# Patient Record
Sex: FEMALE | Race: WHITE | NOT HISPANIC OR LATINO | Employment: OTHER | RURAL
[De-identification: names, ages, dates, MRNs, and addresses within clinical notes are randomized per-mention and may not be internally consistent; named-entity substitution may affect disease eponyms.]

---

## 2024-06-04 ENCOUNTER — OFFICE VISIT (OUTPATIENT)
Dept: FAMILY MEDICINE CLINIC | Facility: CLINIC | Age: 74
End: 2024-06-04
Payer: MEDICARE

## 2024-06-04 VITALS
OXYGEN SATURATION: 95 % | DIASTOLIC BLOOD PRESSURE: 60 MMHG | BODY MASS INDEX: 45.99 KG/M2 | WEIGHT: 293 LBS | SYSTOLIC BLOOD PRESSURE: 120 MMHG | HEIGHT: 67 IN | HEART RATE: 50 BPM

## 2024-06-04 DIAGNOSIS — G89.4 CHRONIC PAIN SYNDROME: ICD-10-CM

## 2024-06-04 DIAGNOSIS — E11.42 TYPE 2 DIABETES MELLITUS WITH DIABETIC POLYNEUROPATHY, WITH LONG-TERM CURRENT USE OF INSULIN: ICD-10-CM

## 2024-06-04 DIAGNOSIS — E03.9 ACQUIRED HYPOTHYROIDISM: ICD-10-CM

## 2024-06-04 DIAGNOSIS — E78.2 MIXED HYPERLIPIDEMIA: ICD-10-CM

## 2024-06-04 DIAGNOSIS — I10 ESSENTIAL HYPERTENSION: Primary | ICD-10-CM

## 2024-06-04 DIAGNOSIS — Z79.01 CHRONIC ANTICOAGULATION: ICD-10-CM

## 2024-06-04 DIAGNOSIS — Z79.4 TYPE 2 DIABETES MELLITUS WITH DIABETIC POLYNEUROPATHY, WITH LONG-TERM CURRENT USE OF INSULIN: ICD-10-CM

## 2024-06-04 DIAGNOSIS — H60.332 ACUTE SWIMMER'S EAR OF LEFT SIDE: ICD-10-CM

## 2024-06-04 PROCEDURE — 1125F AMNT PAIN NOTED PAIN PRSNT: CPT | Performed by: STUDENT IN AN ORGANIZED HEALTH CARE EDUCATION/TRAINING PROGRAM

## 2024-06-04 PROCEDURE — 99204 OFFICE O/P NEW MOD 45 MIN: CPT | Performed by: STUDENT IN AN ORGANIZED HEALTH CARE EDUCATION/TRAINING PROGRAM

## 2024-06-04 RX ORDER — INSULIN DETEMIR 100 [IU]/ML
INJECTION, SOLUTION SUBCUTANEOUS
COMMUNITY
Start: 2024-05-07

## 2024-06-04 RX ORDER — OXYBUTYNIN CHLORIDE 10 MG/1
TABLET, EXTENDED RELEASE ORAL
COMMUNITY
Start: 2024-05-16

## 2024-06-04 RX ORDER — CLOPIDOGREL BISULFATE 75 MG/1
TABLET ORAL
COMMUNITY
Start: 2024-05-08

## 2024-06-04 RX ORDER — CELECOXIB 50 MG/1
CAPSULE ORAL
COMMUNITY
Start: 2024-03-13

## 2024-06-04 RX ORDER — GABAPENTIN 400 MG/1
1 CAPSULE ORAL 3 TIMES DAILY
COMMUNITY
Start: 2024-05-16

## 2024-06-04 RX ORDER — ASPIRIN 81 MG/1
81 TABLET ORAL
COMMUNITY

## 2024-06-04 RX ORDER — HYDROCODONE BITARTRATE AND ACETAMINOPHEN 5; 325 MG/1; MG/1
TABLET ORAL
COMMUNITY
Start: 2024-04-29

## 2024-06-04 RX ORDER — BEMPEDOIC ACID AND EZETIMIBE 180; 10 MG/1; MG/1
TABLET, FILM COATED ORAL
COMMUNITY
Start: 2024-05-08

## 2024-06-04 RX ORDER — RIVAROXABAN 15 MG/1
TABLET, FILM COATED ORAL
COMMUNITY
Start: 2024-05-29

## 2024-06-04 RX ORDER — POTASSIUM CHLORIDE 20 MEQ/1
20 TABLET, EXTENDED RELEASE ORAL DAILY
COMMUNITY

## 2024-06-04 RX ORDER — ALBUTEROL SULFATE 90 UG/1
AEROSOL, METERED RESPIRATORY (INHALATION)
COMMUNITY

## 2024-06-04 RX ORDER — ZINC GLUCONATE 50 MG
50 TABLET ORAL DAILY
COMMUNITY

## 2024-06-04 RX ORDER — CIPROFLOXACIN 0.5 MG/.25ML
0.25 SOLUTION/ DROPS AURICULAR (OTIC) 2 TIMES DAILY
Qty: 1 EACH | Refills: 0 | Status: SHIPPED | OUTPATIENT
Start: 2024-06-04 | End: 2024-06-06

## 2024-06-04 RX ORDER — SENNOSIDES A AND B 8.6 MG/1
17.2 TABLET, FILM COATED ORAL
COMMUNITY
Start: 2023-07-28 | End: 2024-07-27

## 2024-06-04 RX ORDER — LISINOPRIL 10 MG/1
TABLET ORAL
COMMUNITY
Start: 2024-02-16

## 2024-06-04 RX ORDER — SEMAGLUTIDE 1.34 MG/ML
INJECTION, SOLUTION SUBCUTANEOUS
COMMUNITY
Start: 2024-03-11

## 2024-06-04 RX ORDER — LEVOTHYROXINE SODIUM 0.15 MG/1
TABLET ORAL
COMMUNITY
Start: 2024-03-11

## 2024-06-04 RX ORDER — METOPROLOL SUCCINATE 25 MG/1
TABLET, EXTENDED RELEASE ORAL
COMMUNITY

## 2024-06-04 RX ORDER — CITALOPRAM 20 MG/1
TABLET ORAL
COMMUNITY
Start: 2024-05-20

## 2024-06-04 RX ORDER — INSULIN ASPART 100 [IU]/ML
INJECTION, SOLUTION INTRAVENOUS; SUBCUTANEOUS
COMMUNITY
Start: 2024-02-19

## 2024-06-04 RX ORDER — ROSUVASTATIN CALCIUM 40 MG/1
TABLET, COATED ORAL
COMMUNITY
Start: 2024-03-11

## 2024-06-04 RX ORDER — BUSPIRONE HYDROCHLORIDE 5 MG/1
TABLET ORAL
COMMUNITY
Start: 2024-05-20

## 2024-06-04 RX ORDER — DAPAGLIFLOZIN 10 MG/1
10 TABLET, FILM COATED ORAL DAILY
COMMUNITY
Start: 2023-07-29 | End: 2024-07-28

## 2024-06-04 NOTE — PROGRESS NOTES
"Chief Complaint  Establish Care (Pt has been seeing a doctor with [facility]. She hurt her Left shoulder a few months ago.)    Subjective          Marisol Mariano presents to [facility]  History of Present Illness    Patient is here to establish care.     Patient is having pain in the left arm and shoulder as well as her right knee.. She has had X-rays and were not told anything for this. She states that she has been having more pain with this.  Patient states that she has not had evaluation of the knee since she had reconstructive surgery on the lower leg and ankle.  Patient states that she is unable to bear weight for any distance secondary to pain.    She is currently on Hydrocodone 5/325 and Gabapentin and takes this consitently.  Per daughter and son she takes at least 2 of the hydrocodone 5/325 daily and occasionally will take a third as well as taking the gabapentin consistently 3 times daily.  Patient only recently moved in with family, and they have been monitoring her medications rigorously since.    She has been having trouble with her ear as well. She has hearing loss in the Right ear and has been having more fullness in the left ear which she states is her good ear.     She see cardiology as well as OBGYN.  Patient was seeing an endocrinologist, but they have retired and she would like to get set up with a new one close if possible.      Objective   Vital Signs:   /60   Pulse 50   Ht 170.2 cm (67\")   Wt 136 kg (299 lb)   SpO2 95%   BMI 46.83 kg/m²     Body mass index is 46.83 kg/m².    Review of Systems    Past History:  Medical History: has a past medical history of Anxiety, COPD (chronic obstructive pulmonary disease), Deep vein thrombosis, Depression, Diabetes mellitus, HL (hearing loss), Hyperlipidemia, Hypertension, Hypothyroidism, Myocardial infarction, Pneumonia, Rheumatoid arthritis, and UTI (urinary tract infection).   Surgical History: has a " past surgical history that includes Varicose vein surgery; Knee Arthroplasty (Left); Leg Surgery (Right); Fracture surgery; Cardiac catheterization; Colonoscopy; Cardiac surgery; Cardiac defibrillator placement; and Tubal ligation.   Family History: family history is not on file.   Social History: reports that she has quit smoking. Her smoking use included cigarettes. She started smoking about 59 years ago. She has a 110 pack-year smoking history. She has never used smokeless tobacco. She reports that she does not currently use alcohol. She reports that she does not use drugs.      Current Outpatient Medications:     busPIRone (BUSPAR) 5 MG tablet, TAKE 1 TABLET BY MOUTH TWO TIMES EVERY DAY, Disp: , Rfl:     celecoxib (CeleBREX) 50 MG capsule, TAKE 1 CAPSULE BY MOUTH EVERY DAY -TAKE WITH FOOD, Disp: , Rfl:     citalopram (CeleXA) 20 MG tablet, TAKE 1 & 1/2 TABLETS BY MOUTH ONE TIME EVERY DAY, Disp: , Rfl:     clopidogrel (PLAVIX) 75 MG tablet, TAKE 1 TABLET BY MOUTH ONE TIME EVERY DAY, Disp: , Rfl:     dapagliflozin Propanediol 10 MG tablet, Take 10 mg by mouth Daily., Disp: , Rfl:     gabapentin (NEURONTIN) 400 MG capsule, Take 1 capsule by mouth 3 times a day., Disp: , Rfl:     HYDROcodone-acetaminophen (NORCO) 5-325 MG per tablet, TAKE 1 TABLET THREE TIMES A DAY IF NEEDED FOR PAIN, Disp: , Rfl:     Levemir FlexPen 100 UNIT/ML injection, ADMINISTER 5 UNIT(S) SUBCUTANEOUSLY EVERY NIGHT AT BEDTIME - DISCARD ANY OPENED PEN AFTER 42 DAYS, Disp: , Rfl:     levothyroxine (SYNTHROID, LEVOTHROID) 150 MCG tablet, TAKE 1 TABLET BY MOUTH ONE TIME EVERY DAY -TAKE ON AN EMPTY STOMACH, Disp: , Rfl:     lisinopril (PRINIVIL,ZESTRIL) 10 MG tablet, TAKE 1 TABLET(S) BY MOUTH EVERY DAY, Disp: , Rfl:     Nexlizet 180-10 MG tablet, TAKE 1 TABLET BY MOUTH ONE TIME EVERY DAY, Disp: , Rfl:     NovoLOG FlexPen 100 UNIT/ML solution pen-injector sc pen, ADMINISTER 5 UNIT(S) SUBCUTANEOUSLY THREE TIMES A DAY WITH MEALS, Disp: , Rfl:      oxybutynin XL (DITROPAN-XL) 10 MG 24 hr tablet, TAKE 2 TABLETS BY MOUTH ONE TIME EVERY DAY, Disp: , Rfl:     Ozempic, 1 MG/DOSE, 4 MG/3ML solution pen-injector, INJECT 1 MG UNDER THE SKIN EACH WEEK AS DIRECTED, Disp: , Rfl:     rosuvastatin (CRESTOR) 40 MG tablet, TAKE 1 TABLET BY MOUTH EACH NIGHT AT BEDTIME, Disp: , Rfl:     senna (SENOKOT) 8.6 MG tablet, Take 2 tablets by mouth., Disp: , Rfl:     Xarelto 15 MG tablet, , Disp: , Rfl:     albuterol sulfate  (90 Base) MCG/ACT inhaler, puff(s) inhaled 4 times a day, Disp: , Rfl:     aspirin 81 MG EC tablet, Take 1 tablet by mouth., Disp: , Rfl:     Ciprofloxacin HCl (CETRAXAL) 0.2 % otic solution, Administer 0.25 mL into ear(s) as directed by provider 2 (Two) Times a Day., Disp: 1 each, Rfl: 0    metoprolol succinate XL (TOPROL-XL) 25 MG 24 hr tablet, TAKE 1 TABLET BY MOUTH ONE TIME EVERY DAY, Disp: , Rfl:     potassium chloride (KLOR-CON M20) 20 MEQ CR tablet, Take 1 tablet by mouth Daily., Disp: , Rfl:     zinc gluconate 50 MG tablet, Take 1 tablet by mouth Daily., Disp: , Rfl:     Allergies: Patient has no known allergies.    Physical Exam  Constitutional:       General: She is not in acute distress.     Appearance: She is not ill-appearing or toxic-appearing.   HENT:      Head: Normocephalic and atraumatic.   Cardiovascular:      Rate and Rhythm: Normal rate and regular rhythm.      Heart sounds: No murmur heard.  Pulmonary:      Effort: Pulmonary effort is normal. No respiratory distress.   Musculoskeletal:      Right lower leg: Edema (Lymphedmedema) present.      Left lower leg: Edema (Lymphedmedema) present.   Neurological:      General: No focal deficit present.      Mental Status: She is alert and oriented to person, place, and time.   Psychiatric:         Mood and Affect: Mood normal.         Thought Content: Thought content normal.          Result Review :                   Assessment and Plan    Diagnoses and all orders for this visit:    1.  Essential hypertension (Primary)  -     Comprehensive Metabolic Panel; Future  -     CBC & Differential; Future  -     Magnesium; Future    2. Mixed hyperlipidemia    3. Type 2 diabetes mellitus with diabetic polyneuropathy, with long-term current use of insulin  -     Ambulatory Referral to Endocrinology  -     Hemoglobin A1c; Future  -     Lipid Panel; Future    4. Chronic pain syndrome    5. Chronic anticoagulation    6. Acquired hypothyroidism  -     TSH; Future  -     T4, Free; Future    7. Acute swimmer's ear of left side    Other orders  -     Ciprofloxacin HCl (CETRAXAL) 0.2 % otic solution; Administer 0.25 mL into ear(s) as directed by provider 2 (Two) Times a Day.  Dispense: 1 each; Refill: 0    Blood work ordered to have done when she has her cardiology blood work done.    Will send referral to endocrinology for further evaluation and management.    Continue to see both cardiology and OB/GYN.    Patient with outer ear infection.  Will send in Cipro for otitis externa.     Will discuss gabapentin and Hydrocodone refill when needed.     Follow Up   No follow-ups on file.  Patient was given instructions and counseling regarding her condition or for health maintenance advice. Please see specific information pulled into the AVS if appropriate.     Deirdre Atkinson, DO

## 2024-06-06 ENCOUNTER — TELEPHONE (OUTPATIENT)
Dept: FAMILY MEDICINE CLINIC | Facility: CLINIC | Age: 74
End: 2024-06-06
Payer: MEDICARE

## 2024-06-06 RX ORDER — CIPROFLOXACIN HYDROCHLORIDE 3.5 MG/ML
4 SOLUTION/ DROPS TOPICAL 3 TIMES DAILY
Qty: 10 ML | Refills: 0 | Status: SHIPPED | OUTPATIENT
Start: 2024-06-06

## 2024-06-06 NOTE — TELEPHONE ENCOUNTER
Caller: LAURAKAYLA    Relationship: Emergency Contact    Best call back number: [phone]    What medication are you requesting: ANTIBIOTIC FOR BILATERAL EAR INFECTION.    If a prescription is needed, what is your preferred pharmacy and phone number:    [address] [phone]  - [phone]       Additional notes: THE PHARMACY ADVISED THAT     Ciprofloxacin HCl (CETRAXAL) 0.2 % otic solution   IS NOT MADE ANYMORE BY THE .   PLEASE SEND IN ANOTHER RX ASAP SO PATIENT CAN START IT.    THANK YOU!!

## 2024-06-10 RX ORDER — HYDROCODONE BITARTRATE AND ACETAMINOPHEN 5; 325 MG/1; MG/1
TABLET ORAL
OUTPATIENT
Start: 2024-06-10

## 2024-06-10 NOTE — TELEPHONE ENCOUNTER
please call. We need to know why she's taking it, etc.... what we may end up needing to do is me send in enough to get her to where Dr. Long's can see her again to initiate the controlled substance agreement and UDS process.

## 2024-06-10 NOTE — TELEPHONE ENCOUNTER
Michaela, please review since [place] is out, she was a new pt last week and she was not due for refill on this. Please let me know if you want me to call the family and get more information on this.

## 2024-06-10 NOTE — TELEPHONE ENCOUNTER
Caller: TALIA SANCHEZ    Relationship: Child    Best call back number: [phone]     Requested Prescriptions:   Requested Prescriptions     Pending Prescriptions Disp Refills    HYDROcodone-acetaminophen (NORCO) 5-325 MG per tablet          Pharmacy where request should be sent: [address] [phone] [place] [phone]      Last office visit with prescribing clinician: 6/4/2024   Last telemedicine visit with prescribing clinician: Visit date not found   Next office visit with prescribing clinician: 7/5/2024     Does the patient have less than a 3 day supply:  [x] Yes  [] No    Would you like a call back once the refill request has been completed: [x] Yes [] No    If the office needs to give you a call back, can they leave a voicemail: [x] Yes [] No    Ruthy Melo Rep   06/10/24 09:20 EDT

## 2024-06-11 NOTE — TELEPHONE ENCOUNTER
Caller: KAYLA SANCHEZ    Relationship: Emergency Contact    Best call back number: [phone]    What was the call regarding: FOLLOWING UP ON REFILL REQUEST. PATIENT IS COMPLETELY OUT. PLEASE ADVISE    Is it okay if the provider responds through MyChart: NO

## 2024-06-12 DIAGNOSIS — G89.29 CHRONIC SHOULDER PAIN, UNSPECIFIED LATERALITY: ICD-10-CM

## 2024-06-12 DIAGNOSIS — M79.606 PAIN OF LOWER EXTREMITY, UNSPECIFIED LATERALITY: Primary | ICD-10-CM

## 2024-06-12 DIAGNOSIS — M25.519 CHRONIC SHOULDER PAIN, UNSPECIFIED LATERALITY: ICD-10-CM

## 2024-06-12 RX ORDER — HYDROCODONE BITARTRATE AND ACETAMINOPHEN 5; 325 MG/1; MG/1
1 TABLET ORAL EVERY 8 HOURS PRN
Qty: 30 TABLET | Refills: 0 | Status: SHIPPED | OUTPATIENT
Start: 2024-06-12

## 2024-06-25 ENCOUNTER — OFFICE VISIT (OUTPATIENT)
Dept: FAMILY MEDICINE CLINIC | Facility: CLINIC | Age: 74
End: 2024-06-25
Payer: MEDICARE

## 2024-06-25 VITALS
HEART RATE: 56 BPM | WEIGHT: 293 LBS | HEIGHT: 67 IN | DIASTOLIC BLOOD PRESSURE: 50 MMHG | BODY MASS INDEX: 45.99 KG/M2 | OXYGEN SATURATION: 93 % | SYSTOLIC BLOOD PRESSURE: 88 MMHG

## 2024-06-25 DIAGNOSIS — M54.41 CHRONIC BILATERAL LOW BACK PAIN WITH BILATERAL SCIATICA: ICD-10-CM

## 2024-06-25 DIAGNOSIS — G89.29 CHRONIC BILATERAL LOW BACK PAIN WITH BILATERAL SCIATICA: ICD-10-CM

## 2024-06-25 DIAGNOSIS — R05.8 PRODUCTIVE COUGH: ICD-10-CM

## 2024-06-25 DIAGNOSIS — Z79.4 TYPE 2 DIABETES MELLITUS WITH DIABETIC POLYNEUROPATHY, WITH LONG-TERM CURRENT USE OF INSULIN: Primary | ICD-10-CM

## 2024-06-25 DIAGNOSIS — M79.606 PAIN OF LOWER EXTREMITY, UNSPECIFIED LATERALITY: ICD-10-CM

## 2024-06-25 DIAGNOSIS — M25.519 CHRONIC SHOULDER PAIN, UNSPECIFIED LATERALITY: ICD-10-CM

## 2024-06-25 DIAGNOSIS — E11.42 TYPE 2 DIABETES MELLITUS WITH DIABETIC POLYNEUROPATHY, WITH LONG-TERM CURRENT USE OF INSULIN: Primary | ICD-10-CM

## 2024-06-25 DIAGNOSIS — M54.42 CHRONIC BILATERAL LOW BACK PAIN WITH BILATERAL SCIATICA: ICD-10-CM

## 2024-06-25 DIAGNOSIS — Z51.81 MEDICATION MONITORING ENCOUNTER: ICD-10-CM

## 2024-06-25 DIAGNOSIS — G89.29 CHRONIC SHOULDER PAIN, UNSPECIFIED LATERALITY: ICD-10-CM

## 2024-06-25 LAB
POC AMPHETAMINES: NEGATIVE
POC BARBITURATES: NEGATIVE
POC BENZODIAZEPHINES: NEGATIVE
POC COCAINE: NEGATIVE
POC METHADONE: NEGATIVE
POC METHAMPHETAMINE SCREEN URINE: NEGATIVE
POC OPIATES: POSITIVE
POC OXYCODONE: NEGATIVE
POC PHENCYCLIDINE: NEGATIVE
POC PROPOXYPHENE: NEGATIVE
POC THC: NEGATIVE
POC TRICYCLIC ANTIDEPRESSANTS: NEGATIVE

## 2024-06-25 PROCEDURE — 99214 OFFICE O/P EST MOD 30 MIN: CPT | Performed by: STUDENT IN AN ORGANIZED HEALTH CARE EDUCATION/TRAINING PROGRAM

## 2024-06-25 PROCEDURE — 1125F AMNT PAIN NOTED PAIN PRSNT: CPT | Performed by: STUDENT IN AN ORGANIZED HEALTH CARE EDUCATION/TRAINING PROGRAM

## 2024-06-25 RX ORDER — HYDROCODONE BITARTRATE AND ACETAMINOPHEN 5; 325 MG/1; MG/1
1 TABLET ORAL EVERY 8 HOURS PRN
Qty: 90 TABLET | Refills: 0 | Status: SHIPPED | OUTPATIENT
Start: 2024-06-25

## 2024-06-25 RX ORDER — GABAPENTIN 400 MG/1
400 CAPSULE ORAL 3 TIMES DAILY
Qty: 90 CAPSULE | Refills: 2 | Status: SHIPPED | OUTPATIENT
Start: 2024-06-25

## 2024-06-25 RX ORDER — CELECOXIB 50 MG/1
50 CAPSULE ORAL 2 TIMES DAILY
Qty: 180 CAPSULE | Refills: 1 | Status: SHIPPED | OUTPATIENT
Start: 2024-06-25

## 2024-06-25 NOTE — PROGRESS NOTES
"Chief Complaint  Cough (Cough x 2 weeks) and Med Refill (Refill her pain medicine, gabapentin, celebrex)    Subjective          Marisol Mariano presents to [facility]  URI   This is a new problem. The current episode started 1 to 4 weeks ago. The problem has been gradually worsening. Associated symptoms include congestion, coughing, headaches, a plugged ear sensation, rhinorrhea and a sore throat. Associated symptoms comments: headaches. She has tried nothing for the symptoms. The treatment provided mild relief.     Patient states that she has been having worsening pain.  She states that she feels as if the pain medication is not a high enough dose, or she is not getting it frequently enough.  Patient states that she takes the Norco 5/325 every 8 hours, and takes the gabapentin 3 times a day as well.  She also has low-dose Celebrex which she does not know that it helps very much.  She has not been to pain management in the past.            Objective   Vital Signs:   BP (!) 88/50   Pulse 56   Ht 170.2 cm (67\")   Wt 135 kg (298 lb)   SpO2 93%   BMI 46.67 kg/m²     Body mass index is 46.67 kg/m².    Review of Systems   HENT:  Positive for congestion, rhinorrhea and sore throat.    Respiratory:  Positive for cough.        Past History:  Medical History: has a past medical history of Anxiety, COPD (chronic obstructive pulmonary disease), Deep vein thrombosis, Depression, Diabetes mellitus, HL (hearing loss), Hyperlipidemia, Hypertension, Hypothyroidism, Myocardial infarction, Pneumonia, Rheumatoid arthritis, and UTI (urinary tract infection).   Surgical History: has a past surgical history that includes Varicose vein surgery; Knee Arthroplasty (Left); Leg Surgery (Right); Fracture surgery; Cardiac catheterization; Colonoscopy; Cardiac surgery; Cardiac defibrillator placement; and Tubal ligation.   Family History: family history is not on file.   Social History: reports that she has " quit smoking. Her smoking use included cigarettes. She started smoking about 59 years ago. She has a 110 pack-year smoking history. She has never used smokeless tobacco. She reports that she does not currently use alcohol. She reports that she does not use drugs.      Current Outpatient Medications:     celecoxib (CeleBREX) 50 MG capsule, Take 1 capsule by mouth 2 (Two) Times a Day., Disp: 180 capsule, Rfl: 1    gabapentin (NEURONTIN) 400 MG capsule, Take 1 capsule by mouth 3 times a day., Disp: 90 capsule, Rfl: 2    HYDROcodone-acetaminophen (NORCO) 5-325 MG per tablet, Take 1 tablet by mouth Every 8 (Eight) Hours As Needed for Moderate Pain or Severe Pain., Disp: 90 tablet, Rfl: 0    albuterol sulfate  (90 Base) MCG/ACT inhaler, puff(s) inhaled 4 times a day, Disp: , Rfl:     amoxicillin-clavulanate (AUGMENTIN) 875-125 MG per tablet, Take 1 tablet by mouth 2 (Two) Times a Day., Disp: 14 tablet, Rfl: 0    aspirin 81 MG EC tablet, Take 1 tablet by mouth., Disp: , Rfl:     busPIRone (BUSPAR) 5 MG tablet, TAKE 1 TABLET BY MOUTH TWO TIMES EVERY DAY, Disp: , Rfl:     ciprofloxacin (Ciloxan) 0.3 % ophthalmic solution, Administer 4 drops into ear(s) as directed by provider 3 times a day., Disp: 10 mL, Rfl: 0    citalopram (CeleXA) 20 MG tablet, TAKE 1 & 1/2 TABLETS BY MOUTH ONE TIME EVERY DAY, Disp: , Rfl:     clopidogrel (PLAVIX) 75 MG tablet, TAKE 1 TABLET BY MOUTH ONE TIME EVERY DAY, Disp: , Rfl:     dapagliflozin Propanediol 10 MG tablet, Take 10 mg by mouth Daily., Disp: , Rfl:     Levemir FlexPen 100 UNIT/ML injection, ADMINISTER 5 UNIT(S) SUBCUTANEOUSLY EVERY NIGHT AT BEDTIME - DISCARD ANY OPENED PEN AFTER 42 DAYS, Disp: , Rfl:     levothyroxine (SYNTHROID, LEVOTHROID) 150 MCG tablet, TAKE 1 TABLET BY MOUTH ONE TIME EVERY DAY -TAKE ON AN EMPTY STOMACH, Disp: , Rfl:     lisinopril (PRINIVIL,ZESTRIL) 10 MG tablet, TAKE 1 TABLET(S) BY MOUTH EVERY DAY, Disp: , Rfl:     metoprolol succinate XL (TOPROL-XL) 25 MG  24 hr tablet, TAKE 1 TABLET BY MOUTH ONE TIME EVERY DAY, Disp: , Rfl:     Nexlizet 180-10 MG tablet, TAKE 1 TABLET BY MOUTH ONE TIME EVERY DAY, Disp: , Rfl:     NovoLOG FlexPen 100 UNIT/ML solution pen-injector sc pen, ADMINISTER 5 UNIT(S) SUBCUTANEOUSLY THREE TIMES A DAY WITH MEALS, Disp: , Rfl:     oxybutynin XL (DITROPAN-XL) 10 MG 24 hr tablet, TAKE 2 TABLETS BY MOUTH ONE TIME EVERY DAY, Disp: , Rfl:     Ozempic, 1 MG/DOSE, 4 MG/3ML solution pen-injector, INJECT 1 MG UNDER THE SKIN EACH WEEK AS DIRECTED, Disp: , Rfl:     potassium chloride (KLOR-CON M20) 20 MEQ CR tablet, Take 1 tablet by mouth Daily., Disp: , Rfl:     rosuvastatin (CRESTOR) 40 MG tablet, TAKE 1 TABLET BY MOUTH EACH NIGHT AT BEDTIME, Disp: , Rfl:     senna (SENOKOT) 8.6 MG tablet, Take 2 tablets by mouth., Disp: , Rfl:     Xarelto 15 MG tablet, , Disp: , Rfl:     zinc gluconate 50 MG tablet, Take 1 tablet by mouth Daily., Disp: , Rfl:     Allergies: Patient has no known allergies.    Physical Exam  Constitutional:       General: She is not in acute distress.     Appearance: She is not ill-appearing or toxic-appearing.   HENT:      Head: Normocephalic and atraumatic.   Cardiovascular:      Rate and Rhythm: Normal rate and regular rhythm.      Heart sounds: No murmur heard.  Pulmonary:      Effort: Pulmonary effort is normal. No respiratory distress.   Musculoskeletal:      Right lower leg: Edema (Lymphedmedema) present.      Left lower leg: Edema (Lymphedmedema) present.      Comments: In a wheelchair   Neurological:      General: No focal deficit present.      Mental Status: She is alert and oriented to person, place, and time.   Psychiatric:         Mood and Affect: Mood normal.         Thought Content: Thought content normal.          Result Review :                   Assessment and Plan    Diagnoses and all orders for this visit:    1. Type 2 diabetes mellitus with diabetic polyneuropathy, with long-term current use of insulin (Primary)  -      Microalbumin / Creatinine Urine Ratio - Urine, Clean Catch; Future  -     Microalbumin / Creatinine Urine Ratio - Urine, Clean Catch    2. Pain of lower extremity, unspecified laterality  -     gabapentin (NEURONTIN) 400 MG capsule; Take 1 capsule by mouth 3 times a day.  Dispense: 90 capsule; Refill: 2  -     HYDROcodone-acetaminophen (NORCO) 5-325 MG per tablet; Take 1 tablet by mouth Every 8 (Eight) Hours As Needed for Moderate Pain or Severe Pain.  Dispense: 90 tablet; Refill: 0    3. Chronic shoulder pain, unspecified laterality  -     HYDROcodone-acetaminophen (NORCO) 5-325 MG per tablet; Take 1 tablet by mouth Every 8 (Eight) Hours As Needed for Moderate Pain or Severe Pain.  Dispense: 90 tablet; Refill: 0    4. Medication monitoring encounter  -     POC Urine Drug Screen, Triage    5. Productive cough  -     Cancel: XR Chest PA & Lateral (In Office)  -     XR Chest Pa    Other orders  -     celecoxib (CeleBREX) 50 MG capsule; Take 1 capsule by mouth 2 (Two) Times a Day.  Dispense: 180 capsule; Refill: 1  -     amoxicillin-clavulanate (AUGMENTIN) 875-125 MG per tablet; Take 1 tablet by mouth 2 (Two) Times a Day.  Dispense: 14 tablet; Refill: 0    Urine micro performed today.  UDS performed today and appropriate.  Edson reviewed and appropriate.    Will continue gabapentin 4 mg 3 times daily.  Will do Norco 5/325 3 times daily as needed.  Advised that from a primary care setting I will not be increasing this medication, but we can send to pain management to discuss other options.  Patient and family which are present with her are agreeable to this.    Patient concerned that she has a productive cough and possible pneumonia.  Will order chest x-ray and contact with results when available.  If positive for pneumonia will do doxycycline, if negative will do Augmentin for upper respiratory infection.        Follow Up   No follow-ups on file.  Patient was given instructions and counseling regarding her condition  or for health maintenance advice. Please see specific information pulled into the AVS if appropriate.     Deirdre Atkinson, DO

## 2024-06-26 LAB
ALBUMIN/CREAT UR: 49 MG/G CREAT (ref 0–29)
CREAT UR-MCNC: 87.9 MG/DL
MICROALBUMIN UR-MCNC: 43.4 UG/ML

## 2024-06-26 RX ORDER — AMOXICILLIN AND CLAVULANATE POTASSIUM 875; 125 MG/1; MG/1
1 TABLET, FILM COATED ORAL 2 TIMES DAILY
Qty: 14 TABLET | Refills: 0 | Status: SHIPPED | OUTPATIENT
Start: 2024-06-26

## 2024-07-02 ENCOUNTER — TELEPHONE (OUTPATIENT)
Dept: FAMILY MEDICINE CLINIC | Facility: CLINIC | Age: 74
End: 2024-07-02
Payer: MEDICARE

## 2024-07-08 RX ORDER — LEVOTHYROXINE SODIUM 0.15 MG/1
150 TABLET ORAL DAILY
Qty: 90 TABLET | Refills: 1 | Status: SHIPPED | OUTPATIENT
Start: 2024-07-08 | End: 2024-07-10 | Stop reason: SDUPTHER

## 2024-07-08 RX ORDER — DAPAGLIFLOZIN 10 MG/1
10 TABLET, FILM COATED ORAL DAILY
Qty: 30 TABLET | Refills: 11 | Status: SHIPPED | OUTPATIENT
Start: 2024-07-08 | End: 2024-07-10 | Stop reason: SDUPTHER

## 2024-07-08 NOTE — TELEPHONE ENCOUNTER
Caller: TALIA    Relationship:     Best call back number: [phone]    Requested Prescriptions:   Requested Prescriptions     Pending Prescriptions Disp Refills    levothyroxine (SYNTHROID, LEVOTHROID) 150 MCG tablet      dapagliflozin Propanediol 10 MG tablet 30 tablet 11     Sig: Take 10 mg by mouth Daily.        Pharmacy where request should be sent: [address] [phone] [facility] [phone] FX     Last office visit with prescribing clinician: 6/25/2024   Last telemedicine visit with prescribing clinician: Visit date not found   Next office visit with prescribing clinician: Visit date not found     Additional details provided by patient: PT IS OUT    Does the patient have less than a 3 day supply:  [x] Yes  [] No    Would you like a call back once the refill request has been completed: [x] Yes [] No    If the office needs to give you a call back, can they leave a voicemail: [x] Yes [] No    Ruthy Cespedes   07/08/24 11:02 EDT

## 2024-07-08 NOTE — TELEPHONE ENCOUNTER
Rx Refill Note  Requested Prescriptions     Pending Prescriptions Disp Refills    levothyroxine (SYNTHROID, LEVOTHROID) 150 MCG tablet      dapagliflozin Propanediol 10 MG tablet 30 tablet 11     Sig: Take 10 mg by mouth Daily.      Last office visit with prescribing clinician: 6/25/2024   Last telemedicine visit with prescribing clinician: Visit date not found   Next office visit with prescribing clinician: Visit date not found                         Would you like a call back once the refill request has been completed: [] Yes [] No    If the office needs to give you a call back, can they leave a voicemail: [] Yes [] No    Kamryn Neumann MA  07/08/24, 16:06 EDT

## 2024-07-10 RX ORDER — LEVOTHYROXINE SODIUM 0.15 MG/1
150 TABLET ORAL DAILY
Qty: 90 TABLET | Refills: 1 | Status: SHIPPED | OUTPATIENT
Start: 2024-07-10

## 2024-07-10 RX ORDER — DAPAGLIFLOZIN 10 MG/1
10 TABLET, FILM COATED ORAL DAILY
Qty: 30 TABLET | Refills: 11 | Status: SHIPPED | OUTPATIENT
Start: 2024-07-10 | End: 2025-07-10

## 2024-07-10 NOTE — TELEPHONE ENCOUNTER
Caller: TALIA SANCHEZ    Relationship: Child    Best call back number: [phone]     Requested Prescriptions:   Requested Prescriptions     Pending Prescriptions Disp Refills    levothyroxine (SYNTHROID, LEVOTHROID) 150 MCG tablet 90 tablet 1     Sig: Take 1 tablet by mouth Daily.    dapagliflozin Propanediol 10 MG tablet 30 tablet 11     Sig: Take 10 mg by mouth Daily.        Pharmacy where request should be sent: [address] [phone] [facility] [phone] FX     Last office visit with prescribing clinician: 6/25/2024   Last telemedicine visit with prescribing clinician: Visit date not found   Next office visit with prescribing clinician: Visit date not found     Additional details provided by patient: OUT OF MEDICATION, WAS REQUESTED ON JULY 8TH BUT NOT CALLED IN AND ENCOUNTER CLOSED     Does the patient have less than a 3 day supply:  [x] Yes  [] No    Would you like a call back once the refill request has been completed: [] Yes [x] No    If the office needs to give you a call back, can they leave a voicemail: [] Yes [x] No    Ruthy Chase Rep   07/10/24 16:23 EDT

## 2024-08-15 DIAGNOSIS — M25.519 CHRONIC SHOULDER PAIN, UNSPECIFIED LATERALITY: ICD-10-CM

## 2024-08-15 DIAGNOSIS — M79.606 PAIN OF LOWER EXTREMITY, UNSPECIFIED LATERALITY: ICD-10-CM

## 2024-08-15 DIAGNOSIS — G89.29 CHRONIC SHOULDER PAIN, UNSPECIFIED LATERALITY: ICD-10-CM

## 2024-08-15 RX ORDER — HYDROCODONE BITARTRATE AND ACETAMINOPHEN 5; 325 MG/1; MG/1
1 TABLET ORAL EVERY 8 HOURS PRN
Qty: 90 TABLET | Refills: 0 | Status: SHIPPED | OUTPATIENT
Start: 2024-08-15

## 2024-08-15 NOTE — TELEPHONE ENCOUNTER
Caller: MARYANN SANCHEZDE    Relationship: Child    Best call back number: [phone]     Requested Prescriptions:   Requested Prescriptions     Pending Prescriptions Disp Refills    HYDROcodone-acetaminophen (NORCO) 5-325 MG per tablet 90 tablet 0     Sig: Take 1 tablet by mouth Every 8 (Eight) Hours As Needed for Moderate Pain or Severe Pain.        Pharmacy where request should be sent: [address] [phone] PH - [phone] FX     Last office visit with prescribing clinician: 6/25/2024   Last telemedicine visit with prescribing clinician: Visit date not found   Next office visit with prescribing clinician: Visit date not found     Additional details provided by patient: PLEASE REFILL     Does the patient have less than a 3 day supply:  [] Yes  [x] No    Would you like a call back once the refill request has been completed: [] Yes  No  [x]  If the office needs to give you a call back, can they leave a voicemail: [] Yes [x] No    Ruthy Chase   08/15/24 11:56 EDT

## 2024-08-15 NOTE — TELEPHONE ENCOUNTER
Patient was supposed to have a pain management visit on 7/22/24 at 2PM per Referral note.  Contacted son and he states that the appointment was rescheduled. 1 refill only given. She will have to follow up with Pain management for further discussion of pain control.     Also, Can we contact the office she was referred to to verify that it was rescheduled and when it was made for? Thanks.

## 2024-08-16 NOTE — TELEPHONE ENCOUNTER
HUB TO RELAY: Attempted to contact phone number on file again, no answer, called son, he will call back when he gets home with details of Metoprolol dosage and frequency.

## 2024-08-16 NOTE — TELEPHONE ENCOUNTER
Patient's son called back gave message per Jb he said she takes a 25mg tab once daily with food and usually gets a quantity of 90

## 2024-08-21 RX ORDER — CITALOPRAM 20 MG/1
20 TABLET ORAL DAILY
Qty: 90 TABLET | Refills: 1 | Status: SHIPPED | OUTPATIENT
Start: 2024-08-21

## 2024-08-21 RX ORDER — BUSPIRONE HYDROCHLORIDE 5 MG/1
5 TABLET ORAL 2 TIMES DAILY
Qty: 180 TABLET | Refills: 1 | Status: SHIPPED | OUTPATIENT
Start: 2024-08-21

## 2024-08-21 NOTE — TELEPHONE ENCOUNTER
Rx Refill Note  Requested Prescriptions     Pending Prescriptions Disp Refills    busPIRone (BUSPAR) 5 MG tablet      citalopram (CeleXA) 20 MG tablet        Last office visit with prescribing clinician: 6/25/2024   Last telemedicine visit with prescribing clinician: Visit date not found   Next office visit with prescribing clinician: Visit date not found                         Would you like a call back once the refill request has been completed: [] Yes [] No    If the office needs to give you a call back, can they leave a voicemail: [] Yes [] No    Kamryn Neumann MA  08/21/24, 15:43 EDT

## 2024-08-21 NOTE — TELEPHONE ENCOUNTER
I have refilled the Buspar for BID. The Celexa should be 20 mg only due to other medical problems and medications. So we will need to start taking only 1 tablet of this. Please make sure that family is aware of this.

## 2024-08-21 NOTE — TELEPHONE ENCOUNTER
Caller: Marisol Mariano    Relationship: Self    Best call back number: [phone]     Requested Prescriptions:   Requested Prescriptions     Pending Prescriptions Disp Refills    busPIRone (BUSPAR) 5 MG tablet      citalopram (CeleXA) 20 MG tablet          Pharmacy where request should be sent: [address] [phone] [facility] [phone] FX     Last office visit with prescribing clinician: 6/25/2024   Last telemedicine visit with prescribing clinician: Visit date not found   Next office visit with prescribing clinician: Visit date not found     Additional details provided by patient: PATIENT IS OUT OF BUSPIRONE AND HAS 4 REMAINING OF CELEXA. PLEASE ADVISE     Does the patient have less than a 3 day supply:  [x] Yes  [] No    Would you like a call back once the refill request has been completed: [x] Yes [] No    If the office needs to give you a call back, can they leave a voicemail: [x] Yes [] No    Ruthy Durán Rep   08/21/24 15:28 EDT

## 2024-09-06 ENCOUNTER — TELEPHONE (OUTPATIENT)
Dept: FAMILY MEDICINE CLINIC | Facility: CLINIC | Age: 74
End: 2024-09-06
Payer: MEDICARE

## 2024-09-06 NOTE — TELEPHONE ENCOUNTER
Caller: KAYLA SANCHEZ    Relationship: Emergency Contact    Best call back number: [phone]     What form or medical record are you requesting: REFERRAL FOR TRANSPORTATION TO PODIATRY IN [facility]    Who is requesting this form or medical record from you: [employer]    How would you like to receive the form or medical records (pick-up, mail, fax): FAX    If fax, what is the fax number: [phone]    Timeframe paperwork needed: ASAP    Additional notes: [employer] IS FAXING THIS PAPERWORK TO BE FILLED OUT TODAY AND FAXED BACK. THEY NEED THIS SENT BACK BY THE END OF TODAY. THANK YOU.

## 2024-09-09 RX ORDER — METOPROLOL TARTRATE 25 MG/1
TABLET, FILM COATED ORAL
Qty: 90 TABLET | Refills: 0 | OUTPATIENT
Start: 2024-09-09

## 2024-09-16 ENCOUNTER — TELEPHONE (OUTPATIENT)
Dept: FAMILY MEDICINE CLINIC | Facility: CLINIC | Age: 74
End: 2024-09-16
Payer: MEDICARE

## 2024-09-16 RX ORDER — CLOPIDOGREL BISULFATE 75 MG/1
75 TABLET ORAL DAILY
Qty: 90 TABLET | Refills: 1 | Status: SHIPPED | OUTPATIENT
Start: 2024-09-16

## 2024-09-30 DIAGNOSIS — M79.606 PAIN OF LOWER EXTREMITY, UNSPECIFIED LATERALITY: ICD-10-CM

## 2024-09-30 DIAGNOSIS — M54.41 CHRONIC BILATERAL LOW BACK PAIN WITH BILATERAL SCIATICA: Primary | ICD-10-CM

## 2024-09-30 DIAGNOSIS — M54.42 CHRONIC BILATERAL LOW BACK PAIN WITH BILATERAL SCIATICA: Primary | ICD-10-CM

## 2024-09-30 DIAGNOSIS — G89.29 CHRONIC SHOULDER PAIN, UNSPECIFIED LATERALITY: ICD-10-CM

## 2024-09-30 DIAGNOSIS — G89.29 CHRONIC BILATERAL LOW BACK PAIN WITH BILATERAL SCIATICA: Primary | ICD-10-CM

## 2024-09-30 DIAGNOSIS — M25.519 CHRONIC SHOULDER PAIN, UNSPECIFIED LATERALITY: ICD-10-CM

## 2024-09-30 RX ORDER — HYDROCODONE BITARTRATE AND ACETAMINOPHEN 5; 325 MG/1; MG/1
1 TABLET ORAL EVERY 8 HOURS PRN
Qty: 90 TABLET | Refills: 0 | Status: CANCELLED | OUTPATIENT
Start: 2024-09-30

## 2024-09-30 NOTE — TELEPHONE ENCOUNTER
Caller: TALIA SANCHEZ    Relationship: SON    Best call back number: [phone]    Requested Prescriptions:   Requested Prescriptions     Pending Prescriptions Disp Refills    HYDROcodone-acetaminophen (NORCO) 5-325 MG per tablet 90 tablet 0     Sig: Take 1 tablet by mouth Every 8 (Eight) Hours As Needed for Moderate Pain or Severe Pain.        Pharmacy where request should be sent: [address] [phone] [facility] [phone] FX     Last office visit with prescribing clinician: 6/25/2024   Last telemedicine visit with prescribing clinician: Visit date not found   Next office visit with prescribing clinician: Visit date not found     Does the patient have less than a 3 day supply:  [] Yes  [x] No    Would you like a call back once the refill request has been completed: [] Yes [x] No    If the office needs to give you a call back, can they leave a voicemail: [] Yes [x] No    Ruthy Sloan Rep   09/30/24 10:26 EDT

## 2024-09-30 NOTE — TELEPHONE ENCOUNTER
Patient was told on 8/15 that she needed to follow up with pain management. Can we see if she went to this?    Also, she is due for a follow up.

## 2024-10-02 NOTE — TELEPHONE ENCOUNTER
Patient is due an appointment, but I have already told them that they had to follow up with Pain management and the refill on 8/15/24 was the last refill.  She should have been seen by pain management by now as the initial appointment was in July.

## 2024-10-09 ENCOUNTER — TELEPHONE (OUTPATIENT)
Dept: FAMILY MEDICINE CLINIC | Facility: CLINIC | Age: 74
End: 2024-10-09
Payer: MEDICARE

## 2024-10-09 NOTE — TELEPHONE ENCOUNTER
"EmmettHenna RegAdriannejulianne Rep     DB    10/9/24 11:55 AM  Note      10/09 patients son Gustavo phoned. He wanted to know why his mother's pain medicine had not been sent to pharmacy. I explained that she had been referred to pain management and that office should be taking care of whatever she needs. He said they were not giving her anything. I advised that the RX that was sent on 08/15 by Dr Atkinson was the last time Dr Atkinson would be able to fill that. He wanted to know why he wasn't told that. I let him know it was noted that patient was told on 08/15 that would be the last refill. He asked again why he wasn't notified. I said the patient was. He said I was with my mom at that visit and we were not told that. I explained that Dr Atkinson was out this week and he said \"how convenient\" now my mom has to lay in pain. My 74 year old bedridden, wheelchair mother has to be in pain. Thank you for your time and hung up.        "

## 2024-10-09 NOTE — TELEPHONE ENCOUNTER
Caller: BETH SANCHEZA    Relationship: Emergency Contact    Best call back number: [phone]     What is the medical concern/diagnosis: PAIN OF LOWER EXTREMITY, UNSPECIFIED LATERALITY    CHRONIC SHOULDER PAIN, UNSPECIFIED LATERALITY     CHRONIC BILATERAL LOW BACK PAIN WITH BILATERAL SCIATICA     What specialty or service is being requested: PAIN MANAGEMENT     Any additional details: PATIENT'S (DAUGHTER) KAYLA STATED THAT PATIENT WAS REFERRED TO [facility] AND PATIENT WAS SEEN AND INFORMED THAT THEY WILL NO PRESCRIBE PAIN MEDICATION BUT ONLY DO INJECTION'S AND PATIENT IS UNABLE TO DUE TO THE INJECTION'S AND ASK TO BE REFERRED TO A NEW PAIN MANAGEMENT OFFICE TO BE ABLE TO BE PRESCRIBED PAIN MEDICATION INSTEAD OF INJECTION'S

## 2024-10-09 NOTE — TELEPHONE ENCOUNTER
Contacted Pt daughter regarding message she had left      She had stated that she will wait for PCP to return back from vacation to talk about where Pt could potentially be referred to that doesn't do injections and prescribes pain medication. Advised her we do not know the providers personally and it is up to their discretion. Informed them PCP is out of office until next week or potentially, the week after.     Daughter had voiced understanding       Are they needing to come in office to discuss this further or will a telephone encounter suffice?     Thank you!

## 2024-10-14 NOTE — TELEPHONE ENCOUNTER
See below. This was discussed multiple times both in office and over the phone at the time of the last refill that I was not going to continue this medication and they needed to follow up with pain management, but there have been no shows to this referral to this point. There is another telephone encounter stating that they want to see a different pain management clinic, which I can place, but if they no show appointments then they will be fired from those clinics as they have a very low tolerance for this.

## 2024-10-14 NOTE — TELEPHONE ENCOUNTER
Spoke to pt's dtr. And advised Dr. Atkinson has placed another referral to a different pain mgmt. Provider and they would be contacting her to get scheduled. I advised if they did not hear anything in about a week to follow back up. Pt's dtr. Asked that we contact son with appt. Info. I made note on the referral and made son primary number listed. I advised her to make sure they keep appt. So they can help her with her pain mgmt. Needs going forward.

## 2024-10-21 DIAGNOSIS — M79.606 PAIN OF LOWER EXTREMITY, UNSPECIFIED LATERALITY: ICD-10-CM

## 2024-10-21 RX ORDER — GABAPENTIN 400 MG/1
400 CAPSULE ORAL 3 TIMES DAILY
Qty: 90 CAPSULE | Refills: 0 | Status: SHIPPED | OUTPATIENT
Start: 2024-10-21

## 2024-10-21 RX ORDER — LISINOPRIL 10 MG/1
10 TABLET ORAL DAILY
Qty: 90 TABLET | Refills: 1 | Status: SHIPPED | OUTPATIENT
Start: 2024-10-21

## 2024-10-21 NOTE — TELEPHONE ENCOUNTER
Caller: SANCHEZTALIA    Relationship: Child    Best call back number:       [phone]     Requested Prescriptions:   Requested Prescriptions     Pending Prescriptions Disp Refills    gabapentin (NEURONTIN) 400 MG capsule 90 capsule 2     Sig: Take 1 capsule by mouth 3 times a day.      lisinopril (PRINIVIL,ZESTRIL) 10 MG tablet     Pharmacy where request should be sent:     [address] [phone] PH - [phone] FX     Last office visit with prescribing clinician: 6/25/2024   Last telemedicine visit with prescribing clinician: Visit date not found   Next office visit with prescribing clinician: 11/7/2024     Additional details provided by patient:     CALLER STATED PATIENT HAS (5) DAY SUPPLY LEFT OF MEDICATIONS    Does the patient have less than a 3 day supply:  [] Yes  [x] No    Would you like a call back once the refill request has been completed: [] Yes [] No    If the office needs to give you a call back, can they leave a voicemail: [] Yes [] No    Ruthy Phelan Rep   10/21/24 10:31 EDT

## 2024-11-05 NOTE — TELEPHONE ENCOUNTER
Caller: TALIA SANCHEZ    Relationship: SON     Best call back roz [phone]    Requested Prescriptions:   Requested Prescriptions     Pending Prescriptions Disp Refills    oxybutynin XL (DITROPAN-XL) 10 MG 24 hr tablet          Pharmacy where request should be sent: [address] [phone] PH - [phone] FX     Last office visit with prescribing clinician: 6/25/2024   Last telemedicine visit with prescribing clinician: Visit date not found   Next office visit with prescribing clinician: 11/7/2024     Additional details provided by patient: PATIENT NEEDS A REFILL     Does the patient have less than a 3 day supply:  [] Yes  [x] No    Would you like a call back once the refill request has been completed: [] Yes [x] No    If the office needs to give you a call back, can they leave a voicemail: [] Yes [x] No    Ruthy Sloan Rep   11/05/24 13:53 EST

## 2024-11-06 RX ORDER — OXYBUTYNIN CHLORIDE 10 MG/1
20 TABLET, EXTENDED RELEASE ORAL DAILY
Qty: 60 TABLET | Refills: 1 | Status: SHIPPED | OUTPATIENT
Start: 2024-11-06

## 2024-11-07 ENCOUNTER — OFFICE VISIT (OUTPATIENT)
Dept: FAMILY MEDICINE CLINIC | Facility: CLINIC | Age: 74
End: 2024-11-07
Payer: MEDICARE

## 2024-11-07 VITALS
SYSTOLIC BLOOD PRESSURE: 86 MMHG | HEIGHT: 67 IN | OXYGEN SATURATION: 94 % | DIASTOLIC BLOOD PRESSURE: 50 MMHG | BODY MASS INDEX: 45.99 KG/M2 | WEIGHT: 293 LBS | HEART RATE: 43 BPM

## 2024-11-07 DIAGNOSIS — J43.8 OTHER EMPHYSEMA: ICD-10-CM

## 2024-11-07 DIAGNOSIS — E03.9 ACQUIRED HYPOTHYROIDISM: ICD-10-CM

## 2024-11-07 DIAGNOSIS — E55.9 VITAMIN D DEFICIENCY: ICD-10-CM

## 2024-11-07 DIAGNOSIS — E11.42 TYPE 2 DIABETES MELLITUS WITH DIABETIC POLYNEUROPATHY, WITH LONG-TERM CURRENT USE OF INSULIN: Primary | ICD-10-CM

## 2024-11-07 DIAGNOSIS — I10 ESSENTIAL HYPERTENSION: ICD-10-CM

## 2024-11-07 DIAGNOSIS — Z79.4 TYPE 2 DIABETES MELLITUS WITH DIABETIC POLYNEUROPATHY, WITH LONG-TERM CURRENT USE OF INSULIN: Primary | ICD-10-CM

## 2024-11-07 DIAGNOSIS — E78.2 MIXED HYPERLIPIDEMIA: ICD-10-CM

## 2024-11-07 DIAGNOSIS — E87.5 HYPERKALEMIA: ICD-10-CM

## 2024-11-07 PROCEDURE — G0009 ADMIN PNEUMOCOCCAL VACCINE: HCPCS | Performed by: STUDENT IN AN ORGANIZED HEALTH CARE EDUCATION/TRAINING PROGRAM

## 2024-11-07 PROCEDURE — 99214 OFFICE O/P EST MOD 30 MIN: CPT | Performed by: STUDENT IN AN ORGANIZED HEALTH CARE EDUCATION/TRAINING PROGRAM

## 2024-11-07 PROCEDURE — 90677 PCV20 VACCINE IM: CPT | Performed by: STUDENT IN AN ORGANIZED HEALTH CARE EDUCATION/TRAINING PROGRAM

## 2024-11-07 PROCEDURE — 1125F AMNT PAIN NOTED PAIN PRSNT: CPT | Performed by: STUDENT IN AN ORGANIZED HEALTH CARE EDUCATION/TRAINING PROGRAM

## 2024-11-07 RX ORDER — TIZANIDINE 2 MG/1
TABLET ORAL
COMMUNITY
Start: 2024-10-30

## 2024-11-07 RX ORDER — METOPROLOL TARTRATE 25 MG/1
TABLET, FILM COATED ORAL
Qty: 90 TABLET | Refills: 0 | Status: SHIPPED | OUTPATIENT
Start: 2024-11-07

## 2024-11-07 RX ORDER — HYDROCODONE BITARTRATE AND ACETAMINOPHEN 7.5; 325 MG/1; MG/1
TABLET ORAL
COMMUNITY
Start: 2024-10-30

## 2024-11-07 NOTE — PROGRESS NOTES
"Chief Complaint  Follow-up and Earache    Subjective          Marisol Mariano presents to [facility]  History of Present Illness    Patient states that she has been feeling \" not to good\". She states that she has been to see pain management, and states that she has noticed significant improvement in her symptoms since adding back narcotic pain medicine. Tolerating the Gabapentin well at this time. She states that this and the pin medication from the pain doctor is helping significantly.  She is needing refills on the gabapentin.     Normal blood pressure running 130s/60s, but states that she just took all of her medications prior to coming into the office, which may account for the lower readings.       Objective   Vital Signs:   BP (!) 86/50   Pulse (!) 43   Ht 170.2 cm (67\")   Wt (!) 145 kg (319 lb)   SpO2 94%   BMI 49.96 kg/m²     Body mass index is 49.96 kg/m².    Review of Systems    Past History:  Medical History: has a past medical history of Anxiety, COPD (chronic obstructive pulmonary disease), Deep vein thrombosis, Depression, Diabetes mellitus, HL (hearing loss), Hyperlipidemia, Hypertension, Hypothyroidism, Myocardial infarction, Pneumonia, Rheumatoid arthritis, and UTI (urinary tract infection).   Surgical History: has a past surgical history that includes Varicose vein surgery; Knee Arthroplasty (Left); Leg Surgery (Right); Fracture surgery; Cardiac catheterization; Colonoscopy; Cardiac surgery; Cardiac defibrillator placement; and Tubal ligation.   Family History: family history is not on file.   Social History: reports that she has quit smoking. Her smoking use included cigarettes. She started smoking about 59 years ago. She has a 110 pack-year smoking history. She has never used smokeless tobacco. She reports that she does not currently use alcohol. She reports that she does not use drugs.      Current Outpatient Medications:     HYDROcodone-acetaminophen (NORCO) " 7.5-325 MG per tablet, TAKE 1 TABLET BY MOUTH THREE TIMES A DAY FOR 28 DAYS, Disp: , Rfl:     tiZANidine (ZANAFLEX) 2 MG tablet, TAKE 1 TABLET BY MOUTH TWO TIMES A DAY FOR 28 DAYS, Disp: , Rfl:     albuterol sulfate  (90 Base) MCG/ACT inhaler, puff(s) inhaled 4 times a day, Disp: , Rfl:     aspirin 81 MG EC tablet, Take 1 tablet by mouth., Disp: , Rfl:     busPIRone (BUSPAR) 5 MG tablet, Take 1 tablet by mouth 2 (Two) Times a Day., Disp: 180 tablet, Rfl: 1    celecoxib (CeleBREX) 50 MG capsule, Take 1 capsule by mouth 2 (Two) Times a Day., Disp: 180 capsule, Rfl: 1    citalopram (CeleXA) 20 MG tablet, Take 1 tablet by mouth Daily., Disp: 90 tablet, Rfl: 1    clopidogrel (PLAVIX) 75 MG tablet, Take 1 tablet by mouth Daily., Disp: 90 tablet, Rfl: 1    dapagliflozin Propanediol 10 MG tablet, Take 10 mg by mouth Daily., Disp: 30 tablet, Rfl: 11    gabapentin (NEURONTIN) 400 MG capsule, Take 1 capsule by mouth 3 times a day., Disp: 90 capsule, Rfl: 0    glucose blood test strip, Check glucose ACHS and PRN, Disp: 360 each, Rfl: 3    Levemir FlexPen 100 UNIT/ML injection, ADMINISTER 5 UNIT(S) SUBCUTANEOUSLY EVERY NIGHT AT BEDTIME - DISCARD ANY OPENED PEN AFTER 42 DAYS, Disp: , Rfl:     levothyroxine (SYNTHROID, LEVOTHROID) 150 MCG tablet, Take 1 tablet by mouth Daily., Disp: 90 tablet, Rfl: 1    lisinopril (PRINIVIL,ZESTRIL) 10 MG tablet, Take 1 tablet by mouth Daily., Disp: 90 tablet, Rfl: 1    metoprolol tartrate (LOPRESSOR) 25 MG tablet, TAKE 1 TABLET BY MOUTH ONE TIME EVERY DAY -TAKE WITH FOOD, Disp: 90 tablet, Rfl: 0    Nexlizet 180-10 MG tablet, TAKE 1 TABLET BY MOUTH ONE TIME EVERY DAY, Disp: , Rfl:     NovoLOG FlexPen 100 UNIT/ML solution pen-injector sc pen, ADMINISTER 5 UNIT(S) SUBCUTANEOUSLY THREE TIMES A DAY WITH MEALS, Disp: , Rfl:     oxybutynin XL (DITROPAN-XL) 10 MG 24 hr tablet, Take 2 tablets by mouth Daily., Disp: 60 tablet, Rfl: 1    Ozempic, 1 MG/DOSE, 4 MG/3ML solution pen-injector, INJECT 1 MG  UNDER THE SKIN EACH WEEK AS DIRECTED, Disp: , Rfl:     potassium chloride (KLOR-CON M20) 20 MEQ CR tablet, Take 1 tablet by mouth Daily., Disp: , Rfl:     rosuvastatin (CRESTOR) 40 MG tablet, TAKE 1 TABLET BY MOUTH EACH NIGHT AT BEDTIME, Disp: , Rfl:     Xarelto 15 MG tablet, , Disp: , Rfl:     zinc gluconate 50 MG tablet, Take 1 tablet by mouth Daily., Disp: , Rfl:     Allergies: Patient has no known allergies.    Physical Exam  Constitutional:       General: She is not in acute distress.     Appearance: She is not ill-appearing or toxic-appearing.   HENT:      Head: Normocephalic and atraumatic.   Cardiovascular:      Rate and Rhythm: Normal rate and regular rhythm.      Heart sounds: No murmur heard.  Pulmonary:      Effort: Pulmonary effort is normal. No respiratory distress.   Musculoskeletal:      Right lower leg: Edema (Lymphedmedema) present.      Left lower leg: Edema (Lymphedmedema) present.      Comments: In a wheelchair   Neurological:      General: No focal deficit present.      Mental Status: She is alert and oriented to person, place, and time.   Psychiatric:         Mood and Affect: Mood normal.         Thought Content: Thought content normal.          Result Review :                   Assessment and Plan    Diagnoses and all orders for this visit:    1. Type 2 diabetes mellitus with diabetic polyneuropathy, with long-term current use of insulin (Primary)  -     Comprehensive Metabolic Panel  -     CBC & Differential  -     Hemoglobin A1c    2. Essential hypertension  -     Comprehensive Metabolic Panel  -     CBC & Differential  -     TSH  -     T4, Free    3. Acquired hypothyroidism  -     TSH  -     T4, Free    4. Mixed hyperlipidemia  -     Lipid Panel    5. Vitamin D deficiency  -     Vitamin D,25-Hydroxy    6. Other emphysema  -     Ambulatory Referral to Pulmonology    Other orders  -     Pneumococcal Conjugate Vaccine 20-Valent (PCV20)    Blood work ordered and will contact with results when  available. Will adjust medications based on abnormalities seen.     Discussed importance of keeping her appointments every 3 months for Gabapentin refills and she voiced understanding. Kapser appropriate and CSA signed.     Referral to Pulm as hers is no longer practicing in the area.     Prevnar 20 vaccine given in the office.     Follow up in 3 months for wellness visit or sooner with any new or worsening symptoms.   .         Follow Up   No follow-ups on file.  Patient was given instructions and counseling regarding her condition or for health maintenance advice. Please see specific information pulled into the AVS if appropriate.     Deirdre Atkinson, DO

## 2024-11-08 LAB
25(OH)D3+25(OH)D2 SERPL-MCNC: 62.3 NG/ML (ref 30–100)
ALBUMIN SERPL-MCNC: 4 G/DL (ref 3.8–4.8)
ALP SERPL-CCNC: 71 IU/L (ref 44–121)
ALT SERPL-CCNC: 9 IU/L (ref 0–32)
AST SERPL-CCNC: 15 IU/L (ref 0–40)
BASOPHILS # BLD AUTO: 0.1 X10E3/UL (ref 0–0.2)
BASOPHILS NFR BLD AUTO: 1 %
BILIRUB SERPL-MCNC: 0.3 MG/DL (ref 0–1.2)
BUN SERPL-MCNC: 33 MG/DL (ref 8–27)
BUN/CREAT SERPL: 29 (ref 12–28)
CALCIUM SERPL-MCNC: 9.4 MG/DL (ref 8.7–10.3)
CHLORIDE SERPL-SCNC: 101 MMOL/L (ref 96–106)
CHOLEST SERPL-MCNC: 136 MG/DL (ref 100–199)
CO2 SERPL-SCNC: 26 MMOL/L (ref 20–29)
CREAT SERPL-MCNC: 1.14 MG/DL (ref 0.57–1)
EGFRCR SERPLBLD CKD-EPI 2021: 50 ML/MIN/1.73
EOSINOPHIL # BLD AUTO: 0.2 X10E3/UL (ref 0–0.4)
EOSINOPHIL NFR BLD AUTO: 3 %
ERYTHROCYTE [DISTWIDTH] IN BLOOD BY AUTOMATED COUNT: 12.8 % (ref 11.7–15.4)
GLOBULIN SER CALC-MCNC: 3.1 G/DL (ref 1.5–4.5)
GLUCOSE SERPL-MCNC: 181 MG/DL (ref 70–99)
HBA1C MFR BLD: 7.9 % (ref 4.8–5.6)
HCT VFR BLD AUTO: 42 % (ref 34–46.6)
HDLC SERPL-MCNC: 35 MG/DL
HGB BLD-MCNC: 13.8 G/DL (ref 11.1–15.9)
IMM GRANULOCYTES # BLD AUTO: 0.1 X10E3/UL (ref 0–0.1)
IMM GRANULOCYTES NFR BLD AUTO: 1 %
LDLC SERPL CALC-MCNC: 56 MG/DL (ref 0–99)
LYMPHOCYTES # BLD AUTO: 1.5 X10E3/UL (ref 0.7–3.1)
LYMPHOCYTES NFR BLD AUTO: 23 %
MCH RBC QN AUTO: 31.4 PG (ref 26.6–33)
MCHC RBC AUTO-ENTMCNC: 32.9 G/DL (ref 31.5–35.7)
MCV RBC AUTO: 96 FL (ref 79–97)
MONOCYTES # BLD AUTO: 0.6 X10E3/UL (ref 0.1–0.9)
MONOCYTES NFR BLD AUTO: 10 %
NEUTROPHILS # BLD AUTO: 4 X10E3/UL (ref 1.4–7)
NEUTROPHILS NFR BLD AUTO: 62 %
PLATELET # BLD AUTO: 265 X10E3/UL (ref 150–450)
POTASSIUM SERPL-SCNC: 5.8 MMOL/L (ref 3.5–5.2)
PROT SERPL-MCNC: 7.1 G/DL (ref 6–8.5)
RBC # BLD AUTO: 4.39 X10E6/UL (ref 3.77–5.28)
SODIUM SERPL-SCNC: 140 MMOL/L (ref 134–144)
T4 FREE SERPL-MCNC: 1.33 NG/DL (ref 0.82–1.77)
TRIGL SERPL-MCNC: 287 MG/DL (ref 0–149)
TSH SERPL DL<=0.005 MIU/L-ACNC: 1.97 UIU/ML (ref 0.45–4.5)
VLDLC SERPL CALC-MCNC: 45 MG/DL (ref 5–40)
WBC # BLD AUTO: 6.4 X10E3/UL (ref 3.4–10.8)

## 2024-11-18 ENCOUNTER — TELEPHONE (OUTPATIENT)
Dept: FAMILY MEDICINE CLINIC | Facility: CLINIC | Age: 74
End: 2024-11-18
Payer: MEDICARE

## 2024-11-18 NOTE — TELEPHONE ENCOUNTER
----- Message from Deirdre Atkinson sent at 11/8/2024  4:32 PM EST -----  Regarding: Hyperkalemia  Make sure she came in and had this rechecked

## 2024-11-18 NOTE — TELEPHONE ENCOUNTER
Patient has not made a follow up appointment for recheck of her potassium. Please call and schedule. Thanks.

## 2024-11-20 ENCOUNTER — TELEPHONE (OUTPATIENT)
Dept: FAMILY MEDICINE CLINIC | Facility: CLINIC | Age: 74
End: 2024-11-20
Payer: MEDICARE

## 2024-11-20 NOTE — TELEPHONE ENCOUNTER
LVM for pt to call us back     Hub to relay:  Schedule lab appt 21st or 22nd if not please send message to pcp

## 2024-11-27 ENCOUNTER — LAB (OUTPATIENT)
Dept: FAMILY MEDICINE CLINIC | Facility: CLINIC | Age: 74
End: 2024-11-27
Payer: MEDICARE

## 2024-12-03 ENCOUNTER — TELEPHONE (OUTPATIENT)
Dept: FAMILY MEDICINE CLINIC | Facility: CLINIC | Age: 74
End: 2024-12-03
Payer: MEDICARE

## 2024-12-03 NOTE — TELEPHONE ENCOUNTER
This was the message in the chart about her blood work.    --Please contact patient and let her know that we are going to have to discontinue the Lisinopril as this is a possible cause of her elevation of the potassium. I will switch this to a different medication that will still help with BP, but will hopefully not increase the potassium.     She should also recheck this in about 2 weeks and have a BP check at that time.

## 2024-12-03 NOTE — TELEPHONE ENCOUNTER
Caller: TALIA SANCHEZ    Relationship: Child    Best call back number: [phone]    Which medication are you concerned about:     amLODIPine (NORVASC) 5 MG tablet       Who prescribed you this medication: CHEN CUMMINS    When did you start taking this medication: PATIENT HAS NOT STATED THIS MEDICATION YET.    What are your concerns: PATIENTS SON LOOKED UP THIS MEDICATION AND IT IS SIMLIAR TO A MEDICATION THE PATIENT IS ALREADY TAKING- lisinopril (PRINIVIL,ZESTRIL) 10 MG tablet. PATIENTS SON WANTED TO KNOW IF THE PATIENT IS SUPPOSED TO TAKE BOTH MEDICATION OR IF SHE IS TO STOP THE LISINOPRIL AND START THE AMLODIPINE. PLEASE CALL HIM BACK.

## 2024-12-05 DIAGNOSIS — M79.606 PAIN OF LOWER EXTREMITY, UNSPECIFIED LATERALITY: ICD-10-CM

## 2024-12-05 RX ORDER — GABAPENTIN 400 MG/1
400 CAPSULE ORAL 3 TIMES DAILY
Qty: 90 CAPSULE | Refills: 0 | Status: SHIPPED | OUTPATIENT
Start: 2024-12-05

## 2024-12-05 NOTE — TELEPHONE ENCOUNTER
Rx Refill Note  Requested Prescriptions     Pending Prescriptions Disp Refills    gabapentin (NEURONTIN) 400 MG capsule 90 capsule 0     Sig: Take 1 capsule by mouth 3 times a day.      Last office visit with prescribing clinician: 11/7/2024   Last telemedicine visit with prescribing clinician: Visit date not found   Next office visit with prescribing clinician: 2/7/2025                         Would you like a call back once the refill request has been completed: [] Yes [] No    If the office needs to give you a call back, can they leave a voicemail: [] Yes [] No    Kamryn Neumann MA  12/05/24, 11:32 EST

## 2024-12-05 NOTE — TELEPHONE ENCOUNTER
Caller: TALIA SANCHEZ    Relationship: Child    Best call back number: [phone]    Requested Prescriptions:   Requested Prescriptions     Pending Prescriptions Disp Refills    gabapentin (NEURONTIN) 400 MG capsule 90 capsule 0     Sig: Take 1 capsule by mouth 3 times a day.        Pharmacy where request should be sent: [address] [phone] PH - [phone] FX     Last office visit with prescribing clinician: 11/7/2024   Last telemedicine visit with prescribing clinician: Visit date not found   Next office visit with prescribing clinician: 2/7/2025         Does the patient have less than a 3 day supply:  [] Yes  [x] No    Would you like a call back once the refill request has been completed: [] Yes [x] No    If the office needs to give you a call back, can they leave a voicemail: [] Yes [x] No    Ruthy Soto Rep   12/05/24 10:53 EST         DELETE AFTER READING TO PATIENT: “Thank you for sharing this information with me. I will send a message to the clinical team. Please allow 48 hours for the clinical staff to follow up on this request.”

## 2024-12-09 RX ORDER — METOPROLOL TARTRATE 25 MG/1
TABLET, FILM COATED ORAL
Qty: 90 TABLET | Refills: 0 | Status: SHIPPED | OUTPATIENT
Start: 2024-12-09

## 2024-12-18 PROBLEM — J41.1 MUCOPURULENT CHRONIC BRONCHITIS: Status: ACTIVE | Noted: 2020-12-12

## 2024-12-18 PROBLEM — I50.32 CHRONIC DIASTOLIC HEART FAILURE: Status: ACTIVE | Noted: 2023-07-19

## 2024-12-18 PROBLEM — J44.9 CHRONIC OBSTRUCTIVE PULMONARY DISEASE, UNSPECIFIED: Status: ACTIVE | Noted: 2020-12-12

## 2024-12-18 PROBLEM — E66.01 MORBID OBESITY WITH BODY MASS INDEX (BMI) OF 40.0 OR HIGHER: Status: ACTIVE | Noted: 2024-06-28

## 2024-12-18 PROBLEM — E03.9 HYPOTHYROIDISM: Chronic | Status: ACTIVE | Noted: 2021-01-01

## 2024-12-18 PROBLEM — E78.5 HYPERLIPIDEMIA, UNSPECIFIED: Status: ACTIVE | Noted: 2021-01-01

## 2024-12-18 PROBLEM — E11.9 TYPE 2 DIABETES MELLITUS: Chronic | Status: ACTIVE | Noted: 2023-04-19

## 2024-12-18 PROBLEM — I25.10 CAD (CORONARY ARTERY DISEASE): Chronic | Status: ACTIVE | Noted: 2020-12-12

## 2024-12-18 PROBLEM — I10 HTN (HYPERTENSION): Chronic | Status: ACTIVE | Noted: 2023-07-19

## 2024-12-18 PROBLEM — I50.30 DIASTOLIC HEART FAILURE: Chronic | Status: ACTIVE | Noted: 2023-07-19

## 2024-12-18 PROBLEM — F33.9 MAJOR DEPRESSIVE DISORDER, RECURRENT, UNSPECIFIED: Status: ACTIVE | Noted: 2021-01-01

## 2024-12-18 PROBLEM — G47.33 OSA ON CPAP: Status: ACTIVE | Noted: 2023-03-15

## 2024-12-18 PROBLEM — E66.2 OBESITY HYPOVENTILATION SYNDROME: Chronic | Status: ACTIVE | Noted: 2023-07-19

## 2024-12-18 RX ORDER — CELECOXIB 50 MG/1
50 CAPSULE ORAL 2 TIMES DAILY
Qty: 60 CAPSULE | Refills: 1 | Status: SHIPPED | OUTPATIENT
Start: 2024-12-18

## 2025-01-13 DIAGNOSIS — M79.606 PAIN OF LOWER EXTREMITY, UNSPECIFIED LATERALITY: ICD-10-CM

## 2025-01-13 RX ORDER — GABAPENTIN 400 MG/1
400 CAPSULE ORAL 3 TIMES DAILY
Qty: 90 CAPSULE | Refills: 0 | Status: SHIPPED | OUTPATIENT
Start: 2025-01-13

## 2025-01-13 RX ORDER — LEVOTHYROXINE SODIUM 150 UG/1
150 TABLET ORAL DAILY
Qty: 90 TABLET | Refills: 1 | Status: SHIPPED | OUTPATIENT
Start: 2025-01-13

## 2025-01-13 RX ORDER — OXYBUTYNIN CHLORIDE 10 MG/1
20 TABLET, EXTENDED RELEASE ORAL DAILY
Qty: 60 TABLET | Refills: 1 | Status: SHIPPED | OUTPATIENT
Start: 2025-01-13

## 2025-01-13 NOTE — TELEPHONE ENCOUNTER
Caller: TALIA SANCHEZ    Relationship: Child    Best call back number: [phone]    Requested Prescriptions:   Requested Prescriptions     Pending Prescriptions Disp Refills    levothyroxine (SYNTHROID, LEVOTHROID) 150 MCG tablet 90 tablet 1     Sig: Take 1 tablet by mouth Daily.    oxybutynin XL (DITROPAN-XL) 10 MG 24 hr tablet 60 tablet 1     Sig: Take 2 tablets by mouth Daily.    gabapentin (NEURONTIN) 400 MG capsule 90 capsule 0     Sig: Take 1 capsule by mouth 3 times a day.        Pharmacy where request should be sent: [address] [phone] PH - [phone] FX     Last office visit with prescribing clinician: 11/7/2024   Last telemedicine visit with prescribing clinician: Visit date not found   Next office visit with prescribing clinician: 2/7/2025     Additional details provided by patient: IS PATIENT TAKING AMLODIPINE OR LISINOPRIL? PLEASE ADVISE SON.     Does the patient have less than a 3 day supply:  [x] Yes  [] No    Would you like a call back once the refill request has been completed: [] Yes [x] No    If the office needs to give you a call back, can they leave a voicemail: [] Yes [x] No    Ruthy Dela Cruz Rep   01/13/25 15:17 EST

## 2025-01-30 RX ORDER — AMLODIPINE BESYLATE 5 MG/1
5 TABLET ORAL DAILY
Qty: 30 TABLET | Refills: 0 | Status: SHIPPED | OUTPATIENT
Start: 2025-01-30

## 2025-02-07 ENCOUNTER — OFFICE VISIT (OUTPATIENT)
Dept: FAMILY MEDICINE CLINIC | Facility: CLINIC | Age: 75
End: 2025-02-07
Payer: MEDICARE

## 2025-02-07 VITALS
WEIGHT: 293 LBS | HEART RATE: 56 BPM | BODY MASS INDEX: 45.99 KG/M2 | OXYGEN SATURATION: 89 % | DIASTOLIC BLOOD PRESSURE: 82 MMHG | HEIGHT: 67 IN | SYSTOLIC BLOOD PRESSURE: 130 MMHG

## 2025-02-07 DIAGNOSIS — Z79.4 TYPE 2 DIABETES MELLITUS WITH DIABETIC POLYNEUROPATHY, WITH LONG-TERM CURRENT USE OF INSULIN: Primary | ICD-10-CM

## 2025-02-07 DIAGNOSIS — E87.5 HYPERKALEMIA: ICD-10-CM

## 2025-02-07 DIAGNOSIS — Z51.81 MEDICATION MONITORING ENCOUNTER: ICD-10-CM

## 2025-02-07 DIAGNOSIS — I10 ESSENTIAL HYPERTENSION: ICD-10-CM

## 2025-02-07 DIAGNOSIS — E11.42 TYPE 2 DIABETES MELLITUS WITH DIABETIC POLYNEUROPATHY, WITH LONG-TERM CURRENT USE OF INSULIN: Primary | ICD-10-CM

## 2025-02-07 DIAGNOSIS — E03.9 ACQUIRED HYPOTHYROIDISM: ICD-10-CM

## 2025-02-07 PROCEDURE — 1170F FXNL STATUS ASSESSED: CPT | Performed by: STUDENT IN AN ORGANIZED HEALTH CARE EDUCATION/TRAINING PROGRAM

## 2025-02-07 PROCEDURE — 3075F SYST BP GE 130 - 139MM HG: CPT | Performed by: STUDENT IN AN ORGANIZED HEALTH CARE EDUCATION/TRAINING PROGRAM

## 2025-02-07 PROCEDURE — 1125F AMNT PAIN NOTED PAIN PRSNT: CPT | Performed by: STUDENT IN AN ORGANIZED HEALTH CARE EDUCATION/TRAINING PROGRAM

## 2025-02-07 PROCEDURE — G0439 PPPS, SUBSEQ VISIT: HCPCS | Performed by: STUDENT IN AN ORGANIZED HEALTH CARE EDUCATION/TRAINING PROGRAM

## 2025-02-07 PROCEDURE — 3079F DIAST BP 80-89 MM HG: CPT | Performed by: STUDENT IN AN ORGANIZED HEALTH CARE EDUCATION/TRAINING PROGRAM

## 2025-02-07 NOTE — ASSESSMENT & PLAN NOTE
Orders:    Comprehensive Metabolic Panel    CBC & Differential    Hemoglobin A1c    Lipid Panel    Ambulatory Referral for Diabetic Eye Exam-Optometry

## 2025-02-07 NOTE — PROGRESS NOTES
Subjective   The ABCs of the Annual Wellness Visit  Medicare Wellness Visit      Marisol Mariano is a 75 y.o. patient who presents for a Medicare Wellness Visit.    The following portions of the patient's history were reviewed and   updated as appropriate: allergies, current medications, past family history, past medical history, past social history, past surgical history, and problem list.    Compared to one year ago, the patient's physical   health is the same.  Compared to one year ago, the patient's mental   health is the same.    Recent Hospitalizations:  She was not admitted to the hospital during the last year.     Current Medical Providers:  Patient Care Team:  Deirdre Atkinson DO as PCP - General (Family Medicine)    Outpatient Medications Prior to Visit   Medication Sig Dispense Refill    albuterol sulfate  (90 Base) MCG/ACT inhaler puff(s) inhaled 4 times a day      amLODIPine (NORVASC) 5 MG tablet TAKE 1 TABLET BY MOUTH DAILY. 30 tablet 0    aspirin 81 MG EC tablet Take 1 tablet by mouth.      busPIRone (BUSPAR) 5 MG tablet Take 1 tablet by mouth 2 (Two) Times a Day. 180 tablet 1    celecoxib (CeleBREX) 50 MG capsule TAKE 1 CAPSULE BY MOUTH TWICE DAILY 60 capsule 1    citalopram (CeleXA) 20 MG tablet Take 1 tablet by mouth Daily. 90 tablet 1    clopidogrel (PLAVIX) 75 MG tablet Take 1 tablet by mouth Daily. 90 tablet 1    dapagliflozin Propanediol 10 MG tablet Take 10 mg by mouth Daily. 30 tablet 11    gabapentin (NEURONTIN) 400 MG capsule Take 1 capsule by mouth 3 times a day. 90 capsule 0    glucose blood test strip Check glucose ACHS and  each 3    HYDROcodone-acetaminophen (NORCO) 7.5-325 MG per tablet TAKE 1 TABLET BY MOUTH THREE TIMES A DAY FOR 28 DAYS      Levemir FlexPen 100 UNIT/ML injection ADMINISTER 5 UNIT(S) SUBCUTANEOUSLY EVERY NIGHT AT BEDTIME - DISCARD ANY OPENED PEN AFTER 42 DAYS      levothyroxine (SYNTHROID, LEVOTHROID) 150 MCG tablet Take 1 tablet by mouth Daily.  90 tablet 1    metoprolol tartrate (LOPRESSOR) 25 MG tablet TAKE 1 TABLET BY MOUTH ONE TIME EVERY DAY -TAKE WITH FOOD 90 tablet 0    Nexlizet 180-10 MG tablet TAKE 1 TABLET BY MOUTH ONE TIME EVERY DAY      NovoLOG FlexPen 100 UNIT/ML solution pen-injector sc pen ADMINISTER 5 UNIT(S) SUBCUTANEOUSLY THREE TIMES A DAY WITH MEALS      oxybutynin XL (DITROPAN-XL) 10 MG 24 hr tablet Take 2 tablets by mouth Daily. 60 tablet 1    Ozempic, 1 MG/DOSE, 4 MG/3ML solution pen-injector INJECT 1 MG UNDER THE SKIN EACH WEEK AS DIRECTED      potassium chloride (KLOR-CON M20) 20 MEQ CR tablet Take 1 tablet by mouth Daily.      rosuvastatin (CRESTOR) 40 MG tablet TAKE 1 TABLET BY MOUTH EACH NIGHT AT BEDTIME      tiZANidine (ZANAFLEX) 2 MG tablet TAKE 1 TABLET BY MOUTH TWO TIMES A DAY FOR 28 DAYS      Xarelto 15 MG tablet       zinc gluconate 50 MG tablet Take 1 tablet by mouth Daily.      lisinopril (PRINIVIL,ZESTRIL) 10 MG tablet Take 1 tablet by mouth Daily. 90 tablet 1     No facility-administered medications prior to visit.     Opioid medication/s are on active medication list.  and I have evaluated her active treatment plan and pain score trends (see table).  Vitals:    02/07/25 1404   PainSc:   8   PainLoc: Back     I have reviewed the chart for potential of high risk medication and harmful drug interactions in the elderly.        Aspirin is on active medication list. Aspirin use is indicated based on review of current medical condition/s. Pros and cons of this therapy have been discussed today. Benefits of this medication outweigh potential harm.  Patient has been encouraged to continue taking this medication.  .      Patient Active Problem List   Diagnosis    CAD (coronary artery disease)    Mucopurulent chronic bronchitis    Morbid obesity with body mass index (BMI) of 40.0 or higher    Chronic diastolic heart failure    HTN (hypertension)    Hyperlipidemia, unspecified    Hypothyroidism    Major depressive disorder,  "recurrent, unspecified    ISABELL on CPAP    Obesity hypoventilation syndrome    Type 2 diabetes mellitus     Advance Care Planning Advance Directive is on file.  ACP discussion was held with the patient during this visit. Patient has an advance directive in EMR which is still valid.             Objective   Vitals:    25 1404   BP: 130/82   Pulse: 56   SpO2: (!) 89%   Weight: (!) 145 kg (319 lb)   Height: 170.2 cm (67\")   PainSc:   8   PainLoc: Back       Estimated body mass index is 49.96 kg/m² as calculated from the following:    Height as of this encounter: 170.2 cm (67\").    Weight as of this encounter: 145 kg (319 lb).     Gait and Balance Evaluation:  Wheelchair             Does the patient have evidence of cognitive impairment? No                                                                                               Health  Risk Assessment    Smoking Status:  Social History     Tobacco Use   Smoking Status Former    Average packs/day: 2.0 packs/day for 55.0 years (110.0 ttl pk-yrs)    Types: Cigarettes    Start date:    Smokeless Tobacco Never     Alcohol Consumption:  Social History     Substance and Sexual Activity   Alcohol Use Not Currently       Fall Risk Screen  STEADI Fall Risk Assessment was completed, and patient is at MODERATE risk for falls. Assessment completed on:2025    Depression Screening   Little interest or pleasure in doing things? Not at all   Feeling down, depressed, or hopeless? Several days   PHQ-2 Total Score 1      Health Habits and Functional and Cognitive Screenin/7/2025     2:08 PM   Functional & Cognitive Status   Do you have difficulty preparing food and eating? Yes   Do you have difficulty bathing yourself, getting dressed or grooming yourself? Yes   Do you have difficulty using the toilet? Yes   Do you have difficulty moving around from place to place? Yes   Do you have trouble with steps or getting out of a bed or a chair? Yes   Current Diet Well " Balanced Diet   Dental Exam Not up to date   Eye Exam Up to date   Exercise (times per week) 0 times per week   Current Exercises Include No Regular Exercise   Do you need help using the phone?  Yes   Are you deaf or do you have serious difficulty hearing?  Yes   Do you need help to go to places out of walking distance? Yes   Do you need help shopping? Yes   Do you need help preparing meals?  Yes   Do you need help with housework?  Yes   Do you need help with laundry? Yes   Do you need help taking your medications? Yes   Do you need help managing money? Yes   Do you ever drive or ride in a car without wearing a seat belt? No   Have you felt unusual stress, anger or loneliness in the last month? No   Who do you live with? Child   If you need help, do you have trouble finding someone available to you? No   Have you been bothered in the last four weeks by sexual problems? No   Do you have difficulty concentrating, remembering or making decisions? Yes           Age-appropriate Screening Schedule:  Refer to the list below for future screening recommendations based on patient's age, sex and/or medical conditions. Orders for these recommended tests are listed in the plan section. The patient has been provided with a written plan.    Health Maintenance List  Health Maintenance   Topic Date Due    DIABETIC EYE EXAM  Never done    HEMOGLOBIN A1C  05/07/2025    DXA SCAN  02/07/2025 (Originally 1950)    ZOSTER VACCINE (1 of 2) 02/07/2025 (Originally 1/21/1998)    INFLUENZA VACCINE  03/31/2025 (Originally 7/1/2024)    COVID-19 Vaccine (3 - 2024-25 season) 04/29/2025 (Originally 9/1/2024)    RSV Vaccine - Adults (1 - 1-dose 75+ series) 11/07/2025 (Originally 1/21/2023)    TDAP/TD VACCINES (1 - Tdap) 11/07/2025 (Originally 1/21/1969)    BMI FOLLOWUP  03/11/2025    URINE MICROALBUMIN  06/25/2025    LIPID PANEL  11/07/2025    ANNUAL WELLNESS VISIT  02/07/2026    HEPATITIS C SCREENING  Completed    Pneumococcal Vaccine 65+   Completed                                                                                                                                                CMS Preventative Services Quick Reference  Risk Factors Identified During Encounter  None Identified    The above risks/problems have been discussed with the patient.  Pertinent information has been shared with the patient in the After Visit Summary.  An After Visit Summary and PPPS were made available to the patient.    Follow Up:   Next Medicare Wellness visit to be scheduled in 1 year.     Assessment & Plan  Type 2 diabetes mellitus with diabetic polyneuropathy, with long-term current use of insulin  Orders:    Comprehensive Metabolic Panel    CBC & Differential    Hemoglobin A1c    Lipid Panel    Ambulatory Referral for Diabetic Eye Exam-Optometry    Hyperkalemia  Orders:    Comprehensive Metabolic Panel    Essential hypertension  Orders:    TSH    T4, Free    Acquired hypothyroidism    Orders:    TSH    T4, Free    Medication monitoring encounter          Blood work ordered and will contact with results when available. Will adjust medications based on abnormalities seen.     Patient unable to void at the office today. She will need to do UDS prior to next fill of medication.     Past medical and surgical history as well as allergies, family history and social history were reviewed, and discussed with patient.  Chronic conditions were reviewed as well as medications.   Anticipatory guidance handouts including healthy diet, health maintenance, as well as regular exercise and general instructions were given via [employer], and patient was able to ask questions and discuss any concerns.        Follow Up:   No follow-ups on file.

## 2025-02-08 ENCOUNTER — TELEPHONE (OUTPATIENT)
Dept: FAMILY MEDICINE CLINIC | Facility: CLINIC | Age: 75
End: 2025-02-08
Payer: MEDICARE

## 2025-02-08 LAB
ALBUMIN SERPL-MCNC: 3.9 G/DL (ref 3.8–4.8)
ALP SERPL-CCNC: 82 IU/L (ref 44–121)
ALT SERPL-CCNC: 13 IU/L (ref 0–32)
AST SERPL-CCNC: 18 IU/L (ref 0–40)
BASOPHILS # BLD AUTO: 0 X10E3/UL (ref 0–0.2)
BASOPHILS NFR BLD AUTO: 1 %
BILIRUB SERPL-MCNC: 0.3 MG/DL (ref 0–1.2)
BUN SERPL-MCNC: 24 MG/DL (ref 8–27)
BUN/CREAT SERPL: 22 (ref 12–28)
CALCIUM SERPL-MCNC: 9.5 MG/DL (ref 8.7–10.3)
CHLORIDE SERPL-SCNC: 100 MMOL/L (ref 96–106)
CHOLEST SERPL-MCNC: 110 MG/DL (ref 100–199)
CO2 SERPL-SCNC: 30 MMOL/L (ref 20–29)
CREAT SERPL-MCNC: 1.08 MG/DL (ref 0.57–1)
EGFRCR SERPLBLD CKD-EPI 2021: 53 ML/MIN/1.73
EOSINOPHIL # BLD AUTO: 0.3 X10E3/UL (ref 0–0.4)
EOSINOPHIL NFR BLD AUTO: 5 %
ERYTHROCYTE [DISTWIDTH] IN BLOOD BY AUTOMATED COUNT: 13.6 % (ref 11.7–15.4)
GLOBULIN SER CALC-MCNC: 3.2 G/DL (ref 1.5–4.5)
GLUCOSE SERPL-MCNC: 237 MG/DL (ref 70–99)
HBA1C MFR BLD: 8.4 % (ref 4.8–5.6)
HCT VFR BLD AUTO: 44.6 % (ref 34–46.6)
HDLC SERPL-MCNC: 35 MG/DL
HGB BLD-MCNC: 13.7 G/DL (ref 11.1–15.9)
IMM GRANULOCYTES # BLD AUTO: 0 X10E3/UL (ref 0–0.1)
IMM GRANULOCYTES NFR BLD AUTO: 1 %
LDLC SERPL CALC-MCNC: 42 MG/DL (ref 0–99)
LYMPHOCYTES # BLD AUTO: 1.1 X10E3/UL (ref 0.7–3.1)
LYMPHOCYTES NFR BLD AUTO: 20 %
MCH RBC QN AUTO: 29.9 PG (ref 26.6–33)
MCHC RBC AUTO-ENTMCNC: 30.7 G/DL (ref 31.5–35.7)
MCV RBC AUTO: 97 FL (ref 79–97)
MONOCYTES # BLD AUTO: 0.7 X10E3/UL (ref 0.1–0.9)
MONOCYTES NFR BLD AUTO: 12 %
NEUTROPHILS # BLD AUTO: 3.5 X10E3/UL (ref 1.4–7)
NEUTROPHILS NFR BLD AUTO: 61 %
PLATELET # BLD AUTO: 285 X10E3/UL (ref 150–450)
POTASSIUM SERPL-SCNC: 6.6 MMOL/L (ref 3.5–5.2)
PROT SERPL-MCNC: 7.1 G/DL (ref 6–8.5)
RBC # BLD AUTO: 4.58 X10E6/UL (ref 3.77–5.28)
SODIUM SERPL-SCNC: 140 MMOL/L (ref 134–144)
T4 FREE SERPL-MCNC: 1.39 NG/DL (ref 0.82–1.77)
TRIGL SERPL-MCNC: 204 MG/DL (ref 0–149)
TSH SERPL DL<=0.005 MIU/L-ACNC: 2.24 UIU/ML (ref 0.45–4.5)
VLDLC SERPL CALC-MCNC: 33 MG/DL (ref 5–40)
WBC # BLD AUTO: 5.7 X10E3/UL (ref 3.4–10.8)

## 2025-02-08 NOTE — TELEPHONE ENCOUNTER
ON CALL NOTE: critical lab called with potassium 6.6, notified Gustavo Santos her son whom she lives with and advised that she needs to be taken to emergency room as this level could be fatal. He acknowledged understanding and agreed. Incidentally her med list shows potassium chloride 20 meq daily but he said she was taken off of that medicine months ago, and therefore must be an error in chart, just FYI.

## 2025-02-13 RX ORDER — BUSPIRONE HYDROCHLORIDE 5 MG/1
5 TABLET ORAL 2 TIMES DAILY
Qty: 180 TABLET | Refills: 1 | Status: SHIPPED | OUTPATIENT
Start: 2025-02-13

## 2025-02-13 RX ORDER — CITALOPRAM HYDROBROMIDE 20 MG/1
20 TABLET ORAL DAILY
Qty: 90 TABLET | Refills: 1 | Status: SHIPPED | OUTPATIENT
Start: 2025-02-13

## 2025-02-25 DIAGNOSIS — M79.606 PAIN OF LOWER EXTREMITY, UNSPECIFIED LATERALITY: ICD-10-CM

## 2025-02-25 RX ORDER — GABAPENTIN 400 MG/1
400 CAPSULE ORAL 3 TIMES DAILY
Qty: 90 CAPSULE | Refills: 0 | Status: SHIPPED | OUTPATIENT
Start: 2025-02-25

## 2025-02-25 NOTE — TELEPHONE ENCOUNTER
Caller: TALIA SANCHEZ    Relationship: Child    Best call back number: [phone]     Requested Prescriptions:   Requested Prescriptions     Pending Prescriptions Disp Refills    gabapentin (NEURONTIN) 400 MG capsule 90 capsule 0     Sig: Take 1 capsule by mouth 3 times a day.        Pharmacy where request should be sent: [address] [phone] [facility] [phone]      Last office visit with prescribing clinician: 2/7/2025   Last telemedicine visit with prescribing clinician: Visit date not found   Next office visit with prescribing clinician: Visit date not found       Does the patient have less than a 3 day supply:  [x] Yes  [] No    Would you like a call back once the refill request has been completed: [] Yes [x] No    If the office needs to give you a call back, can they leave a voicemail: [] Yes [x] No    Ruthy Diaz Rep   02/25/25 12:45 EST

## 2025-03-06 NOTE — TELEPHONE ENCOUNTER
Rx Refill Note  Requested Prescriptions     Pending Prescriptions Disp Refills    oxybutynin XL (DITROPAN-XL) 10 MG 24 hr tablet [Pharmacy Med Name: OXYBUTYNIN CL ER 10 Mg TAB 10 Tablet] 60 tablet 1     Sig: Take 2 tablets by mouth Daily.    lisinopril (PRINIVIL,ZESTRIL) 10 MG tablet [Pharmacy Med Name: LISINOPRIL 10 MG TABS 10 Tablet] 30 tablet 1     Sig: Take 1 tablet by mouth Daily.      Last office visit with prescribing clinician: 2/7/2025   Last telemedicine visit with prescribing clinician: Visit date not found   Next office visit with prescribing clinician: Visit date not found                         Would you like a call back once the refill request has been completed: [] Yes [] No    If the office needs to give you a call back, can they leave a voicemail: [] Yes [] No    Kamryn Neumann MA  03/06/25, 13:41 EST

## 2025-03-06 NOTE — TELEPHONE ENCOUNTER
Please verify that she is taking lisinopril. Her BP was low last time, and she had been taken off of the medication by the hospital prior to that visit.

## 2025-03-10 RX ORDER — LISINOPRIL 10 MG/1
10 TABLET ORAL DAILY
Qty: 30 TABLET | Refills: 1 | OUTPATIENT
Start: 2025-03-10

## 2025-03-10 RX ORDER — OXYBUTYNIN CHLORIDE 10 MG/1
20 TABLET, EXTENDED RELEASE ORAL DAILY
Qty: 60 TABLET | Refills: 0 | Status: SHIPPED | OUTPATIENT
Start: 2025-03-10

## 2025-03-11 DIAGNOSIS — R00.2 PALPITATIONS: Primary | ICD-10-CM

## 2025-03-11 RX ORDER — CELECOXIB 50 MG/1
50 CAPSULE ORAL 2 TIMES DAILY
Qty: 60 CAPSULE | Refills: 1 | Status: SHIPPED | OUTPATIENT
Start: 2025-03-11

## 2025-03-11 RX ORDER — AMLODIPINE BESYLATE 5 MG/1
5 TABLET ORAL DAILY
Qty: 30 TABLET | Refills: 0 | Status: SHIPPED | OUTPATIENT
Start: 2025-03-11

## 2025-03-11 NOTE — TELEPHONE ENCOUNTER
Caller: Marisol Mariano    Relationship: Self    Best call back number: [phone]     Requested Prescriptions:   Requested Prescriptions     Pending Prescriptions Disp Refills    celecoxib (CeleBREX) 50 MG capsule 60 capsule 1     Sig: Take 1 capsule by mouth 2 (Two) Times a Day.    amLODIPine (NORVASC) 5 MG tablet 30 tablet 0     Sig: Take 1 tablet by mouth Daily.        Pharmacy where request should be sent: [place] [id number] [place], [place] [phone] [facility]     Last office visit with prescribing clinician: 2/7/2025   Last telemedicine visit with prescribing clinician: Visit date not found   Next office visit with prescribing clinician: Visit date not found       Ruthy Flores Rep   03/11/25 12:35 EDT

## 2025-03-25 ENCOUNTER — TELEPHONE (OUTPATIENT)
Dept: FAMILY MEDICINE CLINIC | Facility: CLINIC | Age: 75
End: 2025-03-25
Payer: MEDICARE

## 2025-03-25 ENCOUNTER — TELEPHONE (OUTPATIENT)
Dept: FAMILY MEDICINE CLINIC | Facility: CLINIC | Age: 75
End: 2025-03-25

## 2025-03-25 RX ORDER — NYSTATIN 100000 [USP'U]/G
POWDER TOPICAL 3 TIMES DAILY
Qty: 60 G | Refills: 0 | Status: SHIPPED | OUTPATIENT
Start: 2025-03-25 | End: 2025-03-31 | Stop reason: SDUPTHER

## 2025-03-25 NOTE — TELEPHONE ENCOUNTER
I will address all calls from today in this.     1: I can send in Nystatin powder for the area under the breast, but if this does not work then she will need to be evaluated in the office.     2: If she is due for oxygen re-certification she will need an in office visit for a face to face and a 6 minute walk or other evaluation for this.     3. I am not sure what he is talking about with the swab vs urine test. Is this for a drug screen, if so the ordering provider will have to order this and let her know where to have it collected because I am pretty sure that we do not have these here.

## 2025-03-25 NOTE — TELEPHONE ENCOUNTER
Caller: TALIA SANCHEZ     Relationship: Other Relative     Best call back number: [phone]      What medication are you requesting: NYSTATIN     What are your current symptoms: YEAST INFECTION UNDER BREAST        If a prescription is needed, what is your preferred pharmacy and phone number: [place], [place] - [address] [place] [address] [phone] [facility] [phone]

## 2025-03-25 NOTE — TELEPHONE ENCOUNTER
Caller: TALIA SANCHEZ    Relationship: Other Relative    Best call back number: [phone]     What medication are you requesting: NYSTATIN    What are your current symptoms: YEAST INFECTION UNDER BREAST      If a prescription is needed, what is your preferred pharmacy and phone number: [address] [phone] [facility] [phone]      Additional notes:

## 2025-03-25 NOTE — TELEPHONE ENCOUNTER
Spoke with patient's son Gustavo.    Relayed provider messages. Scheduled patient an appointment for o2 recertification.      They will reach out to pain management for urine swab

## 2025-03-25 NOTE — TELEPHONE ENCOUNTER
Caller: TALIA SANCHEZ     Relationship: Other Relative     Best call back number: [phone]            What was the call regarding: PATIENTS SON STATES THAT THE PAIN CLINIC IS WANTING THE OFFICE TO SEND AN ORDER FOR A SWAB TEST INSTEAD OF THE NORMAL URINE TEST

## 2025-03-25 NOTE — TELEPHONE ENCOUNTER
Caller: TALIA SANCHEZ    Relationship: Other Relative    Best call back number: [phone]         What was the call regarding: PATIENTS SON STATES THAT HIS MOTHER IS NEEDING A NEW ORDER FOR PORTABLE OXYGEN TANKS FAXED [phone]     Is it okay if the provider responds through MyChart:

## 2025-03-25 NOTE — TELEPHONE ENCOUNTER
Caller: TALIA SANCHEZ    Relationship: Other Relative    Best call back number: [phone]         What was the call regarding: PATIENTS SON STATES THAT THE PAIN CLINIC IS WANTING THE OFFICE TO SEND AN ORDER FOR A SWAB TEST INSTEAD OF THE NORMAL URINE TEST    Is it okay if the provider responds through MyChart:

## 2025-03-28 RX ORDER — RIVAROXABAN 15 MG/1
15 TABLET, FILM COATED ORAL
Qty: 90 TABLET | Refills: 1 | Status: SHIPPED | OUTPATIENT
Start: 2025-03-28

## 2025-03-28 NOTE — TELEPHONE ENCOUNTER
Rx Refill Note  Requested Prescriptions     Pending Prescriptions Disp Refills    Xarelto 15 MG tablet 90 tablet 1     Sig: Take 1 tablet by mouth Daily With Dinner.      Last office visit with prescribing clinician: 12/.26/24  Last telemedicine visit with prescribing clinician: Visit date not found   Next office visit with prescribing clinician: Visit date not found                        Would you like a call back once the refill request has been completed: [] Yes [x] No [] N/A    If the office needs to give you a call back, can they leave a voicemail: [] Yes [x] No [] N/A    Pharmacy Info    Last Fill Date: NA  Rx Written Date: MA  Prescribed Qty: 90  Additional Details from Pharmacy: NA    PATIENT PASSED PROTOCOLS PER HENRIETTA Bill MA  03/28/25, 11:56 EDT

## 2025-03-31 ENCOUNTER — OFFICE VISIT (OUTPATIENT)
Dept: FAMILY MEDICINE CLINIC | Facility: CLINIC | Age: 75
End: 2025-03-31
Payer: MEDICARE

## 2025-03-31 ENCOUNTER — TELEPHONE (OUTPATIENT)
Dept: FAMILY MEDICINE CLINIC | Facility: CLINIC | Age: 75
End: 2025-03-31

## 2025-03-31 VITALS
HEART RATE: 74 BPM | HEIGHT: 67 IN | OXYGEN SATURATION: 86 % | SYSTOLIC BLOOD PRESSURE: 120 MMHG | BODY MASS INDEX: 45.99 KG/M2 | DIASTOLIC BLOOD PRESSURE: 72 MMHG | WEIGHT: 293 LBS

## 2025-03-31 DIAGNOSIS — E66.2 OBESITY HYPOVENTILATION SYNDROME: Chronic | ICD-10-CM

## 2025-03-31 DIAGNOSIS — J41.1 MUCOPURULENT CHRONIC BRONCHITIS: Primary | ICD-10-CM

## 2025-03-31 DIAGNOSIS — B37.9 YEAST INFECTION: ICD-10-CM

## 2025-03-31 DIAGNOSIS — J96.11 CHRONIC RESPIRATORY FAILURE WITH HYPOXIA: ICD-10-CM

## 2025-03-31 RX ORDER — NYSTATIN 100000 [USP'U]/G
POWDER TOPICAL 3 TIMES DAILY
Qty: 60 G | Refills: 0 | Status: SHIPPED | OUTPATIENT
Start: 2025-03-31

## 2025-03-31 NOTE — PROGRESS NOTES
"Chief Complaint  Hospital Follow Up Visit    Subjective          Marisol Mariano presents to [facility]  History of Present Illness    Patient is here for oxygen re-certification. She states that she has not had portable oxygen for \" a long time\" She states that she has not seen the pulmonologist at this time.  She would like to have this rescheduled if possible.    She was seen in the hospital about 2 weeks ago for COPD exacerbation.  She states that she is overall feeling much better, and is back down to using home 2 L.  She states that she is at home and is on her oxygen she is running in the mid 90s for her oxygenation.    Patient also has a yeast infection underneath her breast and is needing a refill of her nystatin at this time.    Objective   Vital Signs:   /72   Pulse 74   Ht 170.2 cm (67\")   Wt (!) 152 kg (334 lb)   SpO2 (!) 86% Comment: resting  BMI 52.31 kg/m²     Body mass index is 52.31 kg/m².    Review of Systems    Past History:  Medical History: has a past medical history of Anxiety, COPD (chronic obstructive pulmonary disease), Deep vein thrombosis, Depression, Diabetes mellitus, HL (hearing loss), Hyperlipidemia, Hypertension, Hypothyroidism, Myocardial infarction, Pneumonia, Rheumatoid arthritis, and UTI (urinary tract infection).   Surgical History: has a past surgical history that includes Varicose vein surgery; Knee Arthroplasty (Left); Leg Surgery (Right); Fracture surgery; Cardiac catheterization; Colonoscopy; Cardiac surgery; Cardiac defibrillator placement; and Tubal ligation.   Family History: family history is not on file.   Social History: reports that she has quit smoking. Her smoking use included cigarettes. She started smoking about 60 years ago. She has a 110 pack-year smoking history. She has never used smokeless tobacco. She reports that she does not currently use alcohol. She reports that she does not use drugs.      Current Outpatient " Medications:     nystatin (MYCOSTATIN) 770299 UNIT/GM powder, Apply  topically to the appropriate area as directed 3 (Three) Times a Day., Disp: 60 g, Rfl: 0    albuterol sulfate  (90 Base) MCG/ACT inhaler, puff(s) inhaled 4 times a day, Disp: , Rfl:     amLODIPine (NORVASC) 5 MG tablet, Take 1 tablet by mouth Daily., Disp: 30 tablet, Rfl: 0    aspirin 81 MG EC tablet, Take 1 tablet by mouth., Disp: , Rfl:     busPIRone (BUSPAR) 5 MG tablet, TAKE 1 TABLET BY MOUTH 2 (TWO) TIMES A DAY., Disp: 180 tablet, Rfl: 1    celecoxib (CeleBREX) 50 MG capsule, Take 1 capsule by mouth 2 (Two) Times a Day., Disp: 60 capsule, Rfl: 1    citalopram (CeleXA) 20 MG tablet, TAKE 1 TABLET BY MOUTH DAILY., Disp: 90 tablet, Rfl: 1    clopidogrel (PLAVIX) 75 MG tablet, Take 1 tablet by mouth Daily., Disp: 90 tablet, Rfl: 1    dapagliflozin Propanediol 10 MG tablet, Take 10 mg by mouth Daily., Disp: 30 tablet, Rfl: 11    gabapentin (NEURONTIN) 400 MG capsule, Take 1 capsule by mouth 3 times a day., Disp: 90 capsule, Rfl: 0    glucose blood test strip, Check glucose ACHS and PRN, Disp: 360 each, Rfl: 3    HYDROcodone-acetaminophen (NORCO) 7.5-325 MG per tablet, TAKE 1 TABLET BY MOUTH THREE TIMES A DAY FOR 28 DAYS, Disp: , Rfl:     Levemir FlexPen 100 UNIT/ML injection, ADMINISTER 5 UNIT(S) SUBCUTANEOUSLY EVERY NIGHT AT BEDTIME - DISCARD ANY OPENED PEN AFTER 42 DAYS, Disp: , Rfl:     levothyroxine (SYNTHROID, LEVOTHROID) 150 MCG tablet, Take 1 tablet by mouth Daily., Disp: 90 tablet, Rfl: 1    metoprolol tartrate (LOPRESSOR) 25 MG tablet, TAKE 1 TABLET BY MOUTH ONE TIME EVERY DAY -TAKE WITH FOOD, Disp: 90 tablet, Rfl: 0    Nexlizet 180-10 MG tablet, TAKE 1 TABLET BY MOUTH ONE TIME EVERY DAY, Disp: , Rfl:     NovoLOG FlexPen 100 UNIT/ML solution pen-injector sc pen, ADMINISTER 5 UNIT(S) SUBCUTANEOUSLY THREE TIMES A DAY WITH MEALS, Disp: , Rfl:     oxybutynin XL (DITROPAN-XL) 10 MG 24 hr tablet, TAKE 2 TABLETS BY MOUTH DAILY., Disp: 60  tablet, Rfl: 0    Ozempic, 1 MG/DOSE, 4 MG/3ML solution pen-injector, INJECT 1 MG UNDER THE SKIN EACH WEEK AS DIRECTED, Disp: , Rfl:     potassium chloride (KLOR-CON M20) 20 MEQ CR tablet, Take 1 tablet by mouth Daily., Disp: , Rfl:     rosuvastatin (CRESTOR) 40 MG tablet, TAKE 1 TABLET BY MOUTH EACH NIGHT AT BEDTIME, Disp: , Rfl:     tiZANidine (ZANAFLEX) 2 MG tablet, TAKE 1 TABLET BY MOUTH TWO TIMES A DAY FOR 28 DAYS, Disp: , Rfl:     Xarelto 15 MG tablet, Take 1 tablet by mouth Daily With Dinner., Disp: 90 tablet, Rfl: 1    zinc gluconate 50 MG tablet, Take 1 tablet by mouth Daily., Disp: , Rfl:     Allergies: Patient has no known allergies.    Physical Exam  Constitutional:       General: She is not in acute distress.     Appearance: She is not ill-appearing or toxic-appearing.   HENT:      Head: Normocephalic and atraumatic.   Cardiovascular:      Rate and Rhythm: Normal rate and regular rhythm.      Heart sounds: No murmur heard.  Pulmonary:      Effort: Pulmonary effort is normal. No respiratory distress.   Musculoskeletal:      Right lower leg: Edema present.      Left lower leg: Edema present.      Comments: Bilateral lymphedema   Skin:     Comments: Yeast infection underneath the right breast   Neurological:      General: No focal deficit present.      Mental Status: She is alert and oriented to person, place, and time.   Psychiatric:         Mood and Affect: Mood normal.         Thought Content: Thought content normal.          Result Review :   The following data was reviewed by: Deirdre Atkinson DO on 03/31/2025:    Data reviewed : Recent hospitalization notes ED note from [facility] on  3/18/25           Assessment and Plan    Diagnoses and all orders for this visit:    1. Mucopurulent chronic bronchitis (Primary)  -     Miscellaneous DME    2. Obesity hypoventilation syndrome  -     Miscellaneous DME    3. Chronic respiratory failure with hypoxia  -     Miscellaneous DME    4. Yeast  infection    Other orders  -     nystatin (MYCOSTATIN) 028997 UNIT/GM powder; Apply  topically to the appropriate area as directed 3 (Three) Times a Day.  Dispense: 60 g; Refill: 0    DME ordered for portable oxygen as patient had resting oxygenation in the 80s upon arrival to the office without having her oxygen on.  Patient requires 2 L of oxygen at baseline.    Caregiver that is with patient at her visit is going to contact the office and let us know which DME company this needs to go to.    Refill of nystatin cream sent.    Will see if we can get her rescheduled with pulmonology for further evaluation and management of her chronic bronchitis and obesity hypoventilation syndrome.        Follow Up   No follow-ups on file.  Patient was given instructions and counseling regarding her condition or for health maintenance advice. Please see specific information pulled into the AVS if appropriate.     Deirdre Atkinson, DO

## 2025-03-31 NOTE — TELEPHONE ENCOUNTER
Caller: TALIA SANCHEZ    Relationship: Child    Best call back number: [phone]      What was the call regarding: PATIENTS SON CALLED IN TO LET THE OFFICE KNOW THAT THEY USE [employer] FOR THE PATIENTS OXYGEN SUPPLIES. THE PHONE NUMBER [phone]

## 2025-04-02 ENCOUNTER — TELEPHONE (OUTPATIENT)
Dept: FAMILY MEDICINE CLINIC | Facility: CLINIC | Age: 75
End: 2025-04-02
Payer: MEDICARE

## 2025-04-02 DIAGNOSIS — J96.11 CHRONIC RESPIRATORY FAILURE WITH HYPOXIA: ICD-10-CM

## 2025-04-02 DIAGNOSIS — E66.2 OBESITY HYPOVENTILATION SYNDROME: ICD-10-CM

## 2025-04-02 DIAGNOSIS — J41.1 MUCOPURULENT CHRONIC BRONCHITIS: Primary | ICD-10-CM

## 2025-04-02 NOTE — TELEPHONE ENCOUNTER
Caller: [employer]    Relationship:     Best call back number: [phone]    What orders are you requesting (i.e. lab or imaging): STATIONARY OXYGEN ORDER     Additional notes: COMPANY IS NEEDING A UPDATED STATIONARY OXYGEN ORDER. SHE IS DUE FOR HER UPDATED 5 YEAR.     PLEASE FAX [phone]

## 2025-04-07 DIAGNOSIS — M79.606 PAIN OF LOWER EXTREMITY, UNSPECIFIED LATERALITY: ICD-10-CM

## 2025-04-07 RX ORDER — LISINOPRIL 10 MG/1
10 TABLET ORAL DAILY
Qty: 30 TABLET | Refills: 1 | OUTPATIENT
Start: 2025-04-07

## 2025-04-07 NOTE — TELEPHONE ENCOUNTER
Caller: TALIA SANCHEZ    Relationship: Significant Other    Best call back number: [phone]     Requested Prescriptions:   Requested Prescriptions     Pending Prescriptions Disp Refills    gabapentin (NEURONTIN) 400 MG capsule 90 capsule 0     Sig: Take 1 capsule by mouth 3 times a day.    oxybutynin XL (DITROPAN-XL) 10 MG 24 hr tablet 60 tablet 0     Sig: Take 2 tablets by mouth Daily.        Pharmacy where request should be sent: [facility] [phone] [place], [place] [phone] FX     Last office visit with prescribing clinician: 3/31/2025   Last telemedicine visit with prescribing clinician: Visit date not found   Next office visit with prescribing clinician: Visit date not found     Does the patient have less than a 3 day supply:  [x] Yes  [] No    Would you like a call back once the refill request has been completed: [] Yes [] No    If the office needs to give you a call back, can they leave a voicemail: [] Yes [] No    Ruthy Melo Rep   04/07/25 12:39 EDT

## 2025-04-09 ENCOUNTER — TELEPHONE (OUTPATIENT)
Dept: FAMILY MEDICINE CLINIC | Facility: CLINIC | Age: 75
End: 2025-04-09
Payer: MEDICARE

## 2025-04-09 RX ORDER — GABAPENTIN 400 MG/1
400 CAPSULE ORAL 3 TIMES DAILY
Qty: 90 CAPSULE | Refills: 0 | Status: SHIPPED | OUTPATIENT
Start: 2025-04-09

## 2025-04-09 RX ORDER — OXYBUTYNIN CHLORIDE 10 MG/1
20 TABLET, EXTENDED RELEASE ORAL DAILY
Qty: 60 TABLET | Refills: 0 | Status: SHIPPED | OUTPATIENT
Start: 2025-04-09

## 2025-04-09 RX ORDER — AMLODIPINE BESYLATE 5 MG/1
5 TABLET ORAL DAILY
Qty: 30 TABLET | Refills: 0 | Status: SHIPPED | OUTPATIENT
Start: 2025-04-09

## 2025-04-09 NOTE — TELEPHONE ENCOUNTER
Caller: TALIA - SON    Relationship: Child    Best call back number: [phone]     Equipment requested: HOME O2 CONCENTRATOR AND PORTABLE O2 CONCENTRATOR.    Reason for the request: COPD    Prescribing Provider: DR CUMMINS    Additional information or concerns: [facility] CANNOT DELIVER O2 WITHOUT LITRE FLOW PRESCRIPTION. PLEASE SENT NEW RX WITH LITRE FLOW TO [facility] PHONE [phone] NEW [phone].

## 2025-04-23 DIAGNOSIS — E78.5 HYPERLIPIDEMIA LDL GOAL <100: Primary | ICD-10-CM

## 2025-04-23 RX ORDER — BEMPEDOIC ACID AND EZETIMIBE 180; 10 MG/1; MG/1
1 TABLET, FILM COATED ORAL DAILY
Qty: 90 TABLET | Refills: 1 | Status: SHIPPED | OUTPATIENT
Start: 2025-04-23

## 2025-04-28 RX ORDER — LISINOPRIL 10 MG/1
10 TABLET ORAL DAILY
Qty: 30 TABLET | Refills: 1 | OUTPATIENT
Start: 2025-04-28

## 2025-05-02 RX ORDER — CLOPIDOGREL BISULFATE 75 MG/1
75 TABLET ORAL DAILY
Qty: 90 TABLET | Refills: 1 | Status: SHIPPED | OUTPATIENT
Start: 2025-05-02

## 2025-05-02 NOTE — TELEPHONE ENCOUNTER
Rx Refill Note  Requested Prescriptions     Pending Prescriptions Disp Refills    clopidogrel (PLAVIX) 75 MG tablet [Pharmacy Med Name: CLOPIDOGREL 75 MG TABLET 75 Tablet] 30 tablet 1     Sig: TAKE 1 TABLET BY MOUTH DAILY.      Last office visit with prescribing clinician: 3/31/2025   Last telemedicine visit with prescribing clinician: Visit date not found   Next office visit with prescribing clinician: Visit date not found                         Would you like a call back once the refill request has been completed: [] Yes [] No    If the office needs to give you a call back, can they leave a voicemail: [] Yes [] No    Kamryn Neumann MA  05/02/25, 14:27 EDT

## 2025-05-06 ENCOUNTER — TELEPHONE (OUTPATIENT)
Age: 75
End: 2025-05-06
Payer: MEDICAID

## 2025-05-06 NOTE — TELEPHONE ENCOUNTER
Patients son, Gustavo, called stating they received a letter from the insurance in regard to patient's Nexlizet. The letter stated that they needed approval. After reviewing patient's chart, the PA for the Nexlizet was approved on 04/25/2025. I got in contact with the pharmacy to see what exactly was going on. Pharmacy states the patient picked up the medicaiton on 04/26/2025 for a $0 charge. I then called the primary contact number on chart and relayed this information over to Gustavo. Gustavo understood and states that it was probably an old letter.

## 2025-05-08 RX ORDER — CELECOXIB 50 MG/1
50 CAPSULE ORAL 2 TIMES DAILY
Qty: 60 CAPSULE | Refills: 1 | Status: SHIPPED | OUTPATIENT
Start: 2025-05-08

## 2025-05-09 ENCOUNTER — EXTERNAL PBMM DATA (OUTPATIENT)
Dept: PHARMACY | Facility: OTHER | Age: 75
End: 2025-05-09
Payer: MEDICAID

## 2025-05-15 DIAGNOSIS — M79.606 PAIN OF LOWER EXTREMITY, UNSPECIFIED LATERALITY: ICD-10-CM

## 2025-05-15 RX ORDER — OXYBUTYNIN CHLORIDE 10 MG/1
20 TABLET, EXTENDED RELEASE ORAL DAILY
Qty: 180 TABLET | Refills: 1 | Status: SHIPPED | OUTPATIENT
Start: 2025-05-15

## 2025-05-15 RX ORDER — GABAPENTIN 400 MG/1
400 CAPSULE ORAL 3 TIMES DAILY
Qty: 90 CAPSULE | Refills: 2 | Status: SHIPPED | OUTPATIENT
Start: 2025-05-15

## 2025-05-15 RX ORDER — METOPROLOL TARTRATE 25 MG/1
25 TABLET, FILM COATED ORAL 2 TIMES DAILY
Qty: 180 TABLET | Refills: 1 | Status: SHIPPED | OUTPATIENT
Start: 2025-05-15

## 2025-05-15 NOTE — TELEPHONE ENCOUNTER
Rx Refill Note  Requested Prescriptions     Pending Prescriptions Disp Refills    metoprolol tartrate (LOPRESSOR) 25 MG tablet 90 tablet 0    gabapentin (NEURONTIN) 400 MG capsule 90 capsule 0     Sig: Take 1 capsule by mouth 3 times a day.    oxybutynin XL (DITROPAN-XL) 10 MG 24 hr tablet 60 tablet 0     Sig: Take 2 tablets by mouth Daily.      Last office visit with prescribing clinician: 3/31/2025   Last telemedicine visit with prescribing clinician: Visit date not found   Next office visit with prescribing clinician: Visit date not found                         Would you like a call back once the refill request has been completed: [] Yes [] No    If the office needs to give you a call back, can they leave a voicemail: [] Yes [] No    Kamryn Neumann MA  05/15/25, 11:44 EDT

## 2025-05-15 NOTE — TELEPHONE ENCOUNTER
Caller: TALIA SANCHEZ    Relationship: Child    Best call back number: [phone]    Requested Prescriptions:   Requested Prescriptions     Pending Prescriptions Disp Refills    metoprolol tartrate (LOPRESSOR) 25 MG tablet 90 tablet 0    gabapentin (NEURONTIN) 400 MG capsule 90 capsule 0     Sig: Take 1 capsule by mouth 3 times a day.    oxybutynin XL (DITROPAN-XL) 10 MG 24 hr tablet 60 tablet 0     Sig: Take 2 tablets by mouth Daily.        Pharmacy where request should be sent: [facility] [phone] [place], [place] [phone]     Last office visit with prescribing clinician: 3/31/2025   Last telemedicine visit with prescribing clinician: Visit date not found   Next office visit with prescribing clinician: Visit date not found     Additional details provided by patient:     Does the patient have less than a 3 day supply:  [] Yes  [x] No    Would you like a call back once the refill request has been completed: [] Yes [x] No    If the office needs to give you a call back, can they leave a voicemail: [] Yes [x] No    Ruthy Barrera Rep   05/15/25 11:41 EDT

## 2025-05-19 RX ORDER — AMLODIPINE BESYLATE 5 MG/1
5 TABLET ORAL DAILY
Qty: 90 TABLET | Refills: 1 | Status: SHIPPED | OUTPATIENT
Start: 2025-05-19

## 2025-05-19 NOTE — TELEPHONE ENCOUNTER
Caller: TALIA SANCHEZ    Relationship: Emergency Contact    Best call back number: [phone]     Requested Prescriptions:   Requested Prescriptions     Pending Prescriptions Disp Refills    amLODIPine (NORVASC) 5 MG tablet 30 tablet 0     Sig: Take 1 tablet by mouth Daily.        Pharmacy where request should be sent: [address] [phone] [facility] [phone] FX     Last office visit with prescribing clinician: 3/31/2025   Last telemedicine visit with prescribing clinician: Visit date not found   Next office visit with prescribing clinician: Visit date not found     Additional details provided by patient: PT HAS 4 DAYS MEDICINE LEFT.    Does the patient have less than a 3 day supply:  [] Yes  [x] No    Would you like a call back once the refill request has been completed: [] Yes [x] No    If the office needs to give you a call back, can they leave a voicemail: [] Yes [x] No    Ruthy Malagon Rep   05/19/25 14:27 EDT

## 2025-05-28 ENCOUNTER — TELEPHONE (OUTPATIENT)
Dept: FAMILY MEDICINE CLINIC | Facility: CLINIC | Age: 75
End: 2025-05-28
Payer: MEDICAID

## 2025-05-28 NOTE — TELEPHONE ENCOUNTER
Caller: TALIA SANCHEZ    Relationship:     Best call back number: [phone]    What is the best time to reach you: ANYTIME     Who are you requesting to speak with (clinical staff, provider,  specific staff member): CLINICAL STAFF     PATIENT SON IS WANTING TO FILE AN APPEAL SO PATIENT TEST STRIPS WOULD BE COVERED BY INSURANCE SINCE IT WAS DENIED. PLEASE CALL TO DISCUSS.

## 2025-05-29 NOTE — TELEPHONE ENCOUNTER
I spoke to the pharmacist, pt has new insurance since she last filled her test strips and the meter she has been using is not preferred on her new plan. Please send a new script for glucometer and supplies. Preferred brands are accucheck and true metrics.

## 2025-05-30 RX ORDER — BLOOD-GLUCOSE METER
1 KIT MISCELLANEOUS DAILY
Qty: 1 EACH | Refills: 0 | Status: SHIPPED | OUTPATIENT
Start: 2025-05-30

## 2025-06-05 RX ORDER — CELECOXIB 50 MG/1
50 CAPSULE ORAL 2 TIMES DAILY
Qty: 60 CAPSULE | Refills: 1 | Status: SHIPPED | OUTPATIENT
Start: 2025-06-05

## 2025-06-11 ENCOUNTER — TELEPHONE (OUTPATIENT)
Dept: FAMILY MEDICINE CLINIC | Facility: CLINIC | Age: 75
End: 2025-06-11

## 2025-06-11 NOTE — TELEPHONE ENCOUNTER
Caller: TALIA SANCHEZ    Relationship: Child    Best call back number: [phone]    What is the medical concern/diagnosis: OXYGEN     What specialty or service is being requested: PULMONOLOGY       Any additional details: PATIENT IS NEEDING PORTABLE OXYGEN ORDERS. [facility] INFORMED SON THAT ORDERS HAVE TO COME FROM A PULMONOLOGIST NOT A PCP. PLEASE CALL TO DISCUSS. PATIENT NEEDS PORTABLE OXYGEN IN ORDER TO LEAVE HOUSE FOR APPT ETC.

## 2025-06-13 NOTE — TELEPHONE ENCOUNTER
[facility]-     [email or web address]  [facility]  [address] [place] [address] [address], [place] [address]   [phone]

## 2025-06-27 ENCOUNTER — OFFICE VISIT (OUTPATIENT)
Dept: FAMILY MEDICINE CLINIC | Facility: CLINIC | Age: 75
End: 2025-06-27
Payer: MEDICARE

## 2025-06-27 VITALS
SYSTOLIC BLOOD PRESSURE: 110 MMHG | HEIGHT: 66 IN | OXYGEN SATURATION: 96 % | BODY MASS INDEX: 47.09 KG/M2 | DIASTOLIC BLOOD PRESSURE: 82 MMHG | WEIGHT: 293 LBS | HEART RATE: 72 BPM

## 2025-06-27 DIAGNOSIS — J43.2 CENTRILOBULAR EMPHYSEMA: ICD-10-CM
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
## 2025-06-27 DIAGNOSIS — R04.2 HEMOPTYSIS: Primary | ICD-10-CM

## 2025-06-27 NOTE — PROGRESS NOTES
"Chief Complaint  Follow-up (Fell out of bed Right arm is hurting up shoulder and back of neck, swelling right eye) and Cough (Has been coughing up blood x 2 today)    Subjective          Marisol Mariano presents to [facility]  History of Present Illness    Patient presents the office today for follow-up from emergency department visit on 6/20/2025.  Patient was seen in the ER after falling out of bed and had complaints of pain all over.  Patient had CT scan of the head, shoulder x-ray, knee x-ray, CT of the chest, and cervical spine.  These were negative other than possible pneumonia seen on the CT chest.  Patient had a UA that was significant for a urinary tract infection and this was treated as well.    Patient states that she is overall feeling a bit better, but continues to have pain especially in the right arm.    According to her caregiver she has had some increased confusion, but this is improving back to baseline.    Objective   Vital Signs:   /82   Pulse 72   Ht 167.6 cm (66\")   Wt (!) 152 kg (334 lb)   SpO2 96%   BMI 53.91 kg/m²     Body mass index is 53.91 kg/m².    Review of Systems    Past History:  Medical History: has a past medical history of Anxiety, COPD (chronic obstructive pulmonary disease), Deep vein thrombosis, Depression, Diabetes mellitus, HL (hearing loss), Hyperlipidemia, Hypertension, Hypothyroidism, Myocardial infarction, Pneumonia, Rheumatoid arthritis, and UTI (urinary tract infection).   Surgical History: has a past surgical history that includes Varicose vein surgery; Knee Arthroplasty (Left); Leg Surgery (Right); Fracture surgery; Cardiac catheterization; Colonoscopy; Cardiac surgery; Cardiac defibrillator placement; and Tubal ligation.   Family History: family history is not on file.   Social History: reports that she quit smoking about 5 years ago. Her smoking use included cigarettes. She started smoking about 60 years ago. She has a 110 " pack-year smoking history. She has never used smokeless tobacco. She reports that she does not currently use alcohol. She reports that she does not use drugs.      Current Outpatient Medications:     albuterol sulfate  (90 Base) MCG/ACT inhaler, puff(s) inhaled 4 times a day, Disp: , Rfl:     amLODIPine (NORVASC) 5 MG tablet, Take 1 tablet by mouth Daily., Disp: 90 tablet, Rfl: 1    aspirin 81 MG EC tablet, Take 1 tablet by mouth., Disp: , Rfl:     busPIRone (BUSPAR) 5 MG tablet, TAKE 1 TABLET BY MOUTH 2 (TWO) TIMES A DAY., Disp: 180 tablet, Rfl: 1    celecoxib (CeleBREX) 50 MG capsule, TAKE 1 CAPSULE BY MOUTH 2 (TWO) TIMES A DAY., Disp: 60 capsule, Rfl: 1    citalopram (CeleXA) 20 MG tablet, TAKE 1 TABLET BY MOUTH DAILY., Disp: 90 tablet, Rfl: 1    clopidogrel (PLAVIX) 75 MG tablet, TAKE 1 TABLET BY MOUTH DAILY., Disp: 90 tablet, Rfl: 1    dapagliflozin Propanediol 10 MG tablet, Take 10 mg by mouth Daily., Disp: 30 tablet, Rfl: 11    gabapentin (NEURONTIN) 400 MG capsule, Take 1 capsule by mouth 3 times a day., Disp: 90 capsule, Rfl: 2    glucose blood test strip, Check glucose ACHS and PRN, Disp: 360 each, Rfl: 3    glucose monitor monitoring kit, Use 1 each Daily. Please dispense insurance preferred with Test strips and lancets to test once daily #90 with 3 refills.  Type 2 Diabetes with Hyperglycemia, Disp: 1 each, Rfl: 0    HYDROcodone-acetaminophen (NORCO) 7.5-325 MG per tablet, TAKE 1 TABLET BY MOUTH THREE TIMES A DAY FOR 28 DAYS, Disp: , Rfl:     Levemir FlexPen 100 UNIT/ML injection, ADMINISTER 5 UNIT(S) SUBCUTANEOUSLY EVERY NIGHT AT BEDTIME - DISCARD ANY OPENED PEN AFTER 42 DAYS, Disp: , Rfl:     levothyroxine (SYNTHROID, LEVOTHROID) 150 MCG tablet, Take 1 tablet by mouth Daily., Disp: 90 tablet, Rfl: 1    metoprolol tartrate (LOPRESSOR) 25 MG tablet, Take 1 tablet by mouth 2 (Two) Times a Day., Disp: 180 tablet, Rfl: 1    Nexlizet 180-10 MG tablet, Take 1 tablet by mouth Daily., Disp: 90 tablet,  Rfl: 1    NovoLOG FlexPen 100 UNIT/ML solution pen-injector sc pen, ADMINISTER 5 UNIT(S) SUBCUTANEOUSLY THREE TIMES A DAY WITH MEALS, Disp: , Rfl:     nystatin (MYCOSTATIN) 955307 UNIT/GM powder, Apply  topically to the appropriate area as directed 3 (Three) Times a Day., Disp: 60 g, Rfl: 0    oxybutynin XL (DITROPAN-XL) 10 MG 24 hr tablet, Take 2 tablets by mouth Daily., Disp: 180 tablet, Rfl: 1    Ozempic, 1 MG/DOSE, 4 MG/3ML solution pen-injector, INJECT 1 MG UNDER THE SKIN EACH WEEK AS DIRECTED, Disp: , Rfl:     potassium chloride (KLOR-CON M20) 20 MEQ CR tablet, Take 1 tablet by mouth Daily., Disp: , Rfl:     rosuvastatin (CRESTOR) 40 MG tablet, TAKE 1 TABLET BY MOUTH EACH NIGHT AT BEDTIME, Disp: , Rfl:     tiZANidine (ZANAFLEX) 2 MG tablet, TAKE 1 TABLET BY MOUTH TWO TIMES A DAY FOR 28 DAYS, Disp: , Rfl:     Xarelto 15 MG tablet, Take 1 tablet by mouth Daily With Dinner., Disp: 90 tablet, Rfl: 1    zinc gluconate 50 MG tablet, Take 1 tablet by mouth Daily., Disp: , Rfl:     Allergies: Patient has no known allergies.    Physical Exam  Constitutional:       General: She is not in acute distress.     Appearance: She is not ill-appearing or toxic-appearing.   HENT:      Head: Normocephalic and atraumatic.   Cardiovascular:      Rate and Rhythm: Normal rate and regular rhythm.      Heart sounds: No murmur heard.  Pulmonary:      Effort: Pulmonary effort is normal. No respiratory distress.   Musculoskeletal:      Right lower leg: No edema.      Left lower leg: No edema.   Neurological:      General: No focal deficit present.      Mental Status: She is alert.   Psychiatric:         Mood and Affect: Mood normal.         Thought Content: Thought content normal.          Result Review :   The following data was reviewed by: Deirdre Atkinson DO on 06/27/2025:    Data reviewed : Radiologic studies CT head, CT chest, shoulder x-rays bilaterally, left knee x-ray and Recent hospitalization notes ER evaluation 6/20/25             Assessment and Plan    Diagnoses and all orders for this visit:    1. Hemoptysis (Primary)  -     Ambulatory Referral to Pulmonology    2. Centrilobular emphysema  -     Ambulatory Referral to Pulmonology    3. Fall, subsequent encounter    Overall patient is doing well at this time, and is improving back to baseline from a mental standpoint.    Patient does admit to continued hemoptysis, so we will make new referral to pulmonology as she did not previously follow-up due to issues with transportation.    Recommended to complete the antibiotics that were given in the hospital and follow-up in the office with any new or worsening symptoms.    Follow Up   No follow-ups on file.  Patient was given instructions and counseling regarding her condition or for health maintenance advice. Please see specific information pulled into the AVS if appropriate.     Deirdre Atkinson, DO

## 2025-06-30 RX ORDER — DAPAGLIFLOZIN 10 MG/1
1 TABLET, FILM COATED ORAL DAILY
Qty: 30 TABLET | Refills: 1 | Status: SHIPPED | OUTPATIENT
Start: 2025-06-30

## 2025-06-30 NOTE — TELEPHONE ENCOUNTER
Called patient let know her medication was refilled and that per Kamryn she is not due for a medication recheck until 3 to 4 months from now and she is not due for an annual until next year pt voiced understanding

## 2025-07-01 RX ORDER — LEVOTHYROXINE SODIUM 150 UG/1
TABLET ORAL
Qty: 30 TABLET | Refills: 1 | Status: SHIPPED | OUTPATIENT
Start: 2025-07-01

## 2025-07-21 RX ORDER — CELECOXIB 50 MG/1
50 CAPSULE ORAL 2 TIMES DAILY
Qty: 60 CAPSULE | Refills: 1 | Status: SHIPPED | OUTPATIENT
Start: 2025-07-21

## 2025-07-21 NOTE — TELEPHONE ENCOUNTER
Caller: BETH SANCHEZA    Relationship: Emergency Contact    Best call back number: [phone]    Requested Prescriptions:   Requested Prescriptions     Pending Prescriptions Disp Refills    celecoxib (CeleBREX) 50 MG capsule 60 capsule 1     Sig: Take 1 capsule by mouth 2 (Two) Times a Day.        Pharmacy where request should be sent: [address] [phone] PH - [phone] FX     Last office visit with prescribing clinician: 6/27/2025   Last telemedicine visit with prescribing clinician: Visit date not found   Next office visit with prescribing clinician: Visit date not found         Does the patient have less than a 3 day supply:  [x] Yes  [] No    Ruthy Soto Rep   07/21/25 09:51 EDT

## 2025-07-28 ENCOUNTER — TELEPHONE (OUTPATIENT)
Age: 75
End: 2025-07-28
Payer: MEDICARE

## 2025-07-28 NOTE — TELEPHONE ENCOUNTER
Caller: [facility]    Relationship: Provider    Best call back number: [phone]    What is the best time to reach you: MON-FR 8:30AM-6:30PM    Who are you requesting to speak with (clinical staff, provider,  specific staff member): STAFF    Do you know the name of the person who called:     What was the call regarding: MARY FROM [facility] CALLED INQUIRING ABOUT MEDICATIONS THE PT IS PRESCRIBED. PT WAS PRESCRIBED XARELTO 15MG, PHARMACY WANTS TO KNOW IF SHE SHOULD STILL TAKE THE PLAVIX PRESCRIBED BY ANOTHER PROVIDER     Is it okay if the provider responds through MyChart:

## 2025-07-29 ENCOUNTER — TELEPHONE (OUTPATIENT)
Dept: FAMILY MEDICINE CLINIC | Facility: CLINIC | Age: 75
End: 2025-07-29

## 2025-07-31 ENCOUNTER — TELEPHONE (OUTPATIENT)
Age: 75
End: 2025-07-31
Payer: MEDICARE

## 2025-07-31 NOTE — TELEPHONE ENCOUNTER
Caller: KAYLA SANCHEZ    Relationship to patient: Emergency Contact    Best call back number: [phone]    Type of visit: FOLLOW UP     Requested date: SOONEST      Additional notes:PT DAUGHTER SAID THAT SHE PICKED UP PT MEDICATION AND THE PHARMACY ADVISED HER THAT PT NEEDS TO BE SEEN. PLEASE ADVISE PT.

## 2025-08-12 RX ORDER — BUSPIRONE HYDROCHLORIDE 5 MG/1
5 TABLET ORAL 2 TIMES DAILY
Qty: 180 TABLET | Refills: 1 | Status: SHIPPED | OUTPATIENT
Start: 2025-08-12

## 2025-08-12 RX ORDER — CITALOPRAM HYDROBROMIDE 20 MG/1
20 TABLET ORAL DAILY
Qty: 90 TABLET | Refills: 1 | Status: SHIPPED | OUTPATIENT
Start: 2025-08-12

## 2025-08-13 ENCOUNTER — OFFICE VISIT (OUTPATIENT)
Age: 75
End: 2025-08-13
Payer: MEDICARE

## 2025-08-13 VITALS
DIASTOLIC BLOOD PRESSURE: 64 MMHG | WEIGHT: 293 LBS | SYSTOLIC BLOOD PRESSURE: 93 MMHG | BODY MASS INDEX: 47.09 KG/M2 | HEART RATE: 50 BPM | HEIGHT: 66 IN

## 2025-08-13 DIAGNOSIS — I10 PRIMARY HYPERTENSION: ICD-10-CM

## 2025-08-13 DIAGNOSIS — E87.5 HYPERKALEMIA: ICD-10-CM

## 2025-08-13 DIAGNOSIS — N39.0 UTI (URINARY TRACT INFECTION), BACTERIAL: ICD-10-CM

## 2025-08-13 DIAGNOSIS — A49.9 UTI (URINARY TRACT INFECTION), BACTERIAL: ICD-10-CM

## 2025-08-13 DIAGNOSIS — E66.01 MORBID OBESITY WITH BODY MASS INDEX (BMI) OF 40.0 OR HIGHER: ICD-10-CM

## 2025-08-13 DIAGNOSIS — E78.5 HYPERLIPIDEMIA LDL GOAL <55: ICD-10-CM

## 2025-08-13 DIAGNOSIS — E11.65 TYPE 2 DIABETES MELLITUS WITH HYPERGLYCEMIA, UNSPECIFIED WHETHER LONG TERM INSULIN USE: ICD-10-CM

## 2025-08-13 DIAGNOSIS — I25.10 CORONARY ARTERY DISEASE INVOLVING NATIVE CORONARY ARTERY OF NATIVE HEART, UNSPECIFIED WHETHER ANGINA PRESENT: Primary | Chronic | ICD-10-CM

## 2025-08-13 DIAGNOSIS — I48.0 PAF (PAROXYSMAL ATRIAL FIBRILLATION): ICD-10-CM

## 2025-08-13 DIAGNOSIS — I87.2 CHRONIC VENOUS INSUFFICIENCY OF LOWER EXTREMITY: ICD-10-CM

## 2025-08-13 RX ORDER — SULFAMETHOXAZOLE AND TRIMETHOPRIM 800; 160 MG/1; MG/1
1 TABLET ORAL 2 TIMES DAILY
Qty: 6 TABLET | Refills: 0 | Status: SHIPPED | OUTPATIENT
Start: 2025-08-13

## 2025-08-13 RX ORDER — SEMAGLUTIDE 1.34 MG/ML
0.25 INJECTION, SOLUTION SUBCUTANEOUS WEEKLY
Qty: 0.75 ML | Refills: 0 | Status: SHIPPED | OUTPATIENT
Start: 2025-08-13

## 2025-08-13 RX ORDER — SEMAGLUTIDE 1.34 MG/ML
0.25 INJECTION, SOLUTION SUBCUTANEOUS WEEKLY
COMMUNITY
End: 2025-08-13 | Stop reason: SDUPTHER

## 2025-08-20 DIAGNOSIS — Z98.890 HISTORY OF LOOP RECORDER: Primary | ICD-10-CM

## 2025-08-26 RX ORDER — DAPAGLIFLOZIN 10 MG/1
1 TABLET, FILM COATED ORAL DAILY
Qty: 30 TABLET | Refills: 1 | OUTPATIENT
Start: 2025-08-26

## 2025-08-29 RX ORDER — LEVOTHYROXINE SODIUM 150 UG/1
TABLET ORAL
Qty: 30 TABLET | Refills: 1 | Status: SHIPPED | OUTPATIENT
Start: 2025-08-29